# Patient Record
Sex: FEMALE | Race: BLACK OR AFRICAN AMERICAN | Employment: UNEMPLOYED | URBAN - METROPOLITAN AREA
[De-identification: names, ages, dates, MRNs, and addresses within clinical notes are randomized per-mention and may not be internally consistent; named-entity substitution may affect disease eponyms.]

---

## 2017-01-03 ENCOUNTER — OFFICE VISIT (OUTPATIENT)
Dept: OBGYN | Facility: CLINIC | Age: 22
End: 2017-01-03
Attending: OBSTETRICS & GYNECOLOGY
Payer: COMMERCIAL

## 2017-01-03 ENCOUNTER — TELEPHONE (OUTPATIENT)
Dept: OBGYN | Facility: CLINIC | Age: 22
End: 2017-01-03

## 2017-01-03 VITALS
HEART RATE: 93 BPM | SYSTOLIC BLOOD PRESSURE: 121 MMHG | BODY MASS INDEX: 40.47 KG/M2 | DIASTOLIC BLOOD PRESSURE: 78 MMHG | WEIGHT: 251.8 LBS | HEIGHT: 66 IN

## 2017-01-03 DIAGNOSIS — O09.93 HRP (HIGH RISK PREGNANCY), THIRD TRIMESTER: ICD-10-CM

## 2017-01-03 DIAGNOSIS — O36.63X0 LGA (LARGE FOR GESTATIONAL AGE) FETUS AFFECTING MANAGEMENT OF MOTHER, THIRD TRIMESTER, NOT APPLICABLE OR UNSPECIFIED FETUS: Primary | ICD-10-CM

## 2017-01-03 DIAGNOSIS — O09.93 HRP (HIGH RISK PREGNANCY), THIRD TRIMESTER: Primary | ICD-10-CM

## 2017-01-03 DIAGNOSIS — O36.63X0 LGA (LARGE FOR GESTATIONAL AGE) FETUS AFFECTING MANAGEMENT OF MOTHER, THIRD TRIMESTER, NOT APPLICABLE OR UNSPECIFIED FETUS: ICD-10-CM

## 2017-01-03 PROBLEM — O36.60X0 LGA (LARGE FOR GESTATIONAL AGE) FETUS AFFECTING MANAGEMENT OF MOTHER: Status: ACTIVE | Noted: 2017-01-03

## 2017-01-03 PROCEDURE — 76816 OB US FOLLOW-UP PER FETUS: CPT | Mod: ZF

## 2017-01-03 PROCEDURE — 99213 OFFICE O/P EST LOW 20 MIN: CPT | Mod: ZF

## 2017-01-03 ASSESSMENT — PAIN SCALES - GENERAL: PAINLEVEL: NO PAIN (0)

## 2017-01-03 NOTE — PROGRESS NOTES
22 year old female, , presents at 40 0/7 weeks for obstetric ultrasound assessment indicated by S>D.    Single fetus    Presentation cephalic    USEGA = 40 1/7 weeks.  EFW = 4532 grams, >97 % for 40 weeks.      MISAEL = 16.6.  FHR = 141bpm     Placenta anterior and grade 1    Comments: LGA growth pattern, normal MISAEL.  Pt does not have a h/o GDM.  Encourage IOL at 41 weeks.    Findings discussed with patient.    Further studies as clinically indicated.      TERRY Antonio MD, FACOG

## 2017-01-03 NOTE — Clinical Note
"1/3/2017       RE: Claudine Wells  [address]   [facility] [address]     Dear Colleague,    Thank you for referring your patient, Claudine Wells, to the [facility] at [facility]. Please see a copy of my visit note below.    Subjective:     22 year old  at 40w0d presents for routine prenatal visit following growth US today.           Denies vaginal bleeding or leakage of fluid.  Occasional contractions.  Positive fetal movement.        No HA, visual changes, RUQ or epigastric pain.   Patient concerns: Generally physically uncomfortable and looking forward to labor. Verbalizes concern with LGA baby and increased risk for c/s.  Objective:  Filed Vitals:    17 1202   BP: 121/78   Pulse: 93   Height: 1.676 m (5' 5.98\")   Weight: 114.216 kg (251 lb 12.8 oz)    See OB flowsheet  Assessment/Plan   (O09.93) HRP (high risk pregnancy), third trimester  (primary encounter diagnosis)  Comment: cx mid and soft.  Reviewed options for 41 week IOL and discussed MD would probably recommend this based on US information today with LGA fetus.  Plan: BPP (Single) w/out NST (In Clinic) if does not opt for IOL at 41 weeks.            - Reviewed postdates testing including BPP => 41 weeks and rationale for induction of labor based on results.   I reviewed with Claudine that I will be discussing US results with Dr Soares after her clinic departure and will call her with this information.  - Reviewed why/how to contact provider if headache/visual changes/RUQ or epigastric pain, decreased fetal movement, vaginal bleeding, leakage of fluid or strong/regular contractions.   Patient education/orders or handouts today:  Return to clinic in 1 week and prn if questions or concerns.     RAQUEL Angel CNM              "

## 2017-01-03 NOTE — Clinical Note
1/3/2017       RE: Claudine Wells  [address]   [place] [address]     Dear Colleague,    Thank you for referring your patient, Claudine Wells, to the [facility] at [facility]. Please see a copy of my visit note below.    22 year old female, , presents at 40 0/7 weeks for obstetric ultrasound assessment indicated by S>D.    Single fetus    Presentation cephalic    USEGA = 40 1/7 weeks.  EFW = 4532 grams, >97 % for 40 weeks.      MISAEL = 16.6.  FHR = 141bpm     Placenta anterior and grade 1    Comments: LGA growth pattern, normal MISAEL.  Pt does not have a h/o GDM.  Encourage IOL at 41 weeks.    Findings discussed with patient.    Further studies as clinically indicated.      TERRY Antonio MD, FACOG

## 2017-01-03 NOTE — TELEPHONE ENCOUNTER
----- Message from Estefany Molina RN sent at 1/3/2017  2:19 PM CST -----  Regarding: induction next week  khushi discussed with Dr Soares and recommendation is for her to be induced at 41 wks,need to let Claudine know this

## 2017-01-03 NOTE — TELEPHONE ENCOUNTER
Spoke to Claudine that Jennifer Cano talked with OB/GYn who is recommending she be induced at 41 wks,next Tuesday 1/19/17,if doesn't go into labor before.  Reviewed appointment for 1/9/17 in clinic.Pt indicated understanding and agreed with plan.      Called and spoke to [facility] at [facility] and scheduled her induction for 0830,1/10/17

## 2017-01-03 NOTE — PROGRESS NOTES
"Subjective:     22 year old  at 40w0d presents for routine prenatal visit following growth US today.           Denies vaginal bleeding or leakage of fluid.  Occasional contractions.  Positive fetal movement.        No HA, visual changes, RUQ or epigastric pain.   Patient concerns: Generally physically uncomfortable and looking forward to labor. Verbalizes concern with LGA baby and increased risk for c/s.  Objective:  Filed Vitals:    17 1202   BP: 121/78   Pulse: 93   Height: 1.676 m (5' 5.98\")   Weight: 114.216 kg (251 lb 12.8 oz)    See OB flowsheet  Assessment/Plan   (O09.93) HRP (high risk pregnancy), third trimester  (primary encounter diagnosis)  Comment: cx mid and soft.  Reviewed options for 41 week IOL and discussed MD would probably recommend this based on US information today with LGA fetus.  Plan: BPP (Single) w/out NST (In Clinic) if does not opt for IOL at 41 weeks.            - Reviewed postdates testing including BPP => 41 weeks and rationale for induction of labor based on results.   I reviewed with Claudine that I will be discussing US results with Dr Soares after her clinic departure and will call her with this information.  - Reviewed why/how to contact provider if headache/visual changes/RUQ or epigastric pain, decreased fetal movement, vaginal bleeding, leakage of fluid or strong/regular contractions.   Patient education/orders or handouts today:  Return to clinic in 1 week and prn if questions or concerns.   RAQUEL Angel CNM      "

## 2017-01-09 ENCOUNTER — OFFICE VISIT (OUTPATIENT)
Dept: OBGYN | Facility: CLINIC | Age: 22
End: 2017-01-09
Attending: OBSTETRICS & GYNECOLOGY
Payer: COMMERCIAL

## 2017-01-09 VITALS
SYSTOLIC BLOOD PRESSURE: 112 MMHG | DIASTOLIC BLOOD PRESSURE: 77 MMHG | HEART RATE: 121 BPM | WEIGHT: 257.5 LBS | BODY MASS INDEX: 41.38 KG/M2 | HEIGHT: 66 IN

## 2017-01-09 DIAGNOSIS — O48.0 POST-TERM PREGNANCY, 40-42 WEEKS OF GESTATION: Primary | ICD-10-CM

## 2017-01-09 DIAGNOSIS — O09.93 HRP (HIGH RISK PREGNANCY), THIRD TRIMESTER: ICD-10-CM

## 2017-01-09 DIAGNOSIS — O09.93 HRP (HIGH RISK PREGNANCY), THIRD TRIMESTER: Primary | ICD-10-CM

## 2017-01-09 DIAGNOSIS — O36.63X0 LGA (LARGE FOR GESTATIONAL AGE) FETUS AFFECTING MANAGEMENT OF MOTHER, THIRD TRIMESTER, NOT APPLICABLE OR UNSPECIFIED FETUS: ICD-10-CM

## 2017-01-09 PROCEDURE — 76819 FETAL BIOPHYS PROFIL W/O NST: CPT | Mod: ZF

## 2017-01-09 PROCEDURE — 99212 OFFICE O/P EST SF 10 MIN: CPT

## 2017-01-09 NOTE — PROGRESS NOTES
22 year old,  , presents at 40 6/7 weeks in pregnancy complicated by post dates, LGA for biophysical assessment.    Fetal Breathing Movements (FBM): Normal - 2  Gross Body Movements (GBM): Normal - 2  Fetal Tone (FT): Normal - 2  AFV: Pocket of amniotic fluid > or = to 2 cm x 2 cm - 2  Quantitative Amniotic Fluid Volume Total: 18.6 cm      BPP 8/8.  FHR = 151bpm   MCA Not done.  NST Not done.     Single fetus in cephalic presentation.  Placenta anterior and grade 1.    Recommend weekly assessment, twice weekly after 42 weeks if undelivered by then.    TERRY Davenport MD, FACOG

## 2017-01-09 NOTE — Clinical Note
"2017       RE: Claudine Wells  [address]   [facility] [address]     Dear Colleague,    Thank you for referring your patient, Claudine Wells, to [facility] Please see a copy of my visit note below.    Subjective:     22 year old  at 40w6d presents for routine prenatal visit.           Denies contractions, vaginal bleeding, discharge or leakage of fluid.  Reports + fetal movement.        No HA, visual changes, RUQ or epigastric pain.     Patient concerns: Feeling well overall but concerned about recommendation for IOL. At this time is unsure about induction tomorrow- concerned about  section and about not going into labor \"naturally.\" Family members also expressing concern. Pt had ultrasound that showed EFW >97%tile. BPP today . MISAEL 18.6. Reviewed ultrasound findings and OB recommendation for IOL at 41 weeks (1/10/17). Pt verbalized understanding of this but states \"I need more time to discuss with my family and decide.\"     Objective:  Filed Vitals:    17 1448   BP: 112/77   Pulse: 121   Height: 1.676 m (5' 5.98\")   Weight: 116.801 kg (257 lb 8 oz)    See OB flowsheet    /-4, posterior/medium, cephalic.  Membrane sweep performed.      Assessment/Plan     Encounter Diagnoses   Name Primary?     HRP (high risk pregnancy), third trimester Yes     LGA (large for gestational age) fetus affecting management of mother, third trimester, not applicable or unspecified fetus        Patient education/orders or handouts today:  Sign/symptoms of labor, When to call for labor or other concerns, Postdates testing discussion, BPP, NST and Induction of labor    - In depth discussion of recommendation by MD for IOL at 41 weeks d/t postdates and LGA on ultrasound.   - Reviewed cervical ripening and induction methods, including cervidil, misoprostol, cook catheter and pitocin.  - Discussed IOL rationale and risks vs " benefits. Reviewed u/s findings of LGA and risks.   - Also reviewed this information again with patient's sister via telephone.  - Pt verbalized understanding and states she would like to discuss with her family and call the clinic back with her decision. She is unsure if she wants an IOL tomorrow.  - Discussed need for BPP by Thursday if she does not proceed with IOL before that time.  - Oncoming CNMs notified about pt's plan to call tonight or tomorrow AM with decision about IOL.   - Plan for IOL on 1/10 if in agreement or need to plan for BPP on Thursday if not induced (order placed).  - Reviewed labor precautions, fetal movement counts and s/s to call/present to triage.    RAQUEL Izquierdo, CNM

## 2017-01-09 NOTE — NURSING NOTE
Chief Complaint   Patient presents with     Prenatal Care     40 weeks 6 days.        Ana M Pulido, [facility] 1/9/2017

## 2017-01-09 NOTE — Clinical Note
2017       RE: Claudine Wells  [address]   [facility] [address]     Dear Colleague,    Thank you for referring your patient, Claudine Wells, to the [facility] at [facility]. Please see a copy of my visit note below.    22 year old,  , presents at 40 6/7 weeks in pregnancy complicated by post dates, LGA for biophysical assessment.    Fetal Breathing Movements (FBM): Normal - 2  Gross Body Movements (GBM): Normal - 2  Fetal Tone (FT): Normal - 2  AFV: Pocket of amniotic fluid > or = to 2 cm x 2 cm - 2  Quantitative Amniotic Fluid Volume Total: 18.6 cm      BPP 8/8.  FHR = 151bpm   MCA Not done.  NST Not done.     Single fetus in cephalic presentation.  Placenta anterior and grade 1.    Recommend weekly assessment, twice weekly after 42 weeks if undelivered by then.    Anna Avina,TERRY Soares MD, FACOG

## 2017-01-09 NOTE — PROGRESS NOTES
"Subjective:     22 year old  at 40w6d presents for routine prenatal visit.           Denies contractions, vaginal bleeding, discharge or leakage of fluid.  Reports + fetal movement.        No HA, visual changes, RUQ or epigastric pain.     Patient concerns: Feeling well overall but concerned about recommendation for IOL. At this time is unsure about induction tomorrow- concerned about  section and about not going into labor \"naturally.\" Family members also expressing concern. Pt had ultrasound that showed EFW >97%tile. BPP today . MISAEL 18.6. Reviewed ultrasound findings and OB recommendation for IOL at 41 weeks (1/10/17). Pt verbalized understanding of this but states \"I need more time to discuss with my family and decide.\"     Objective:  Filed Vitals:    17 1448   BP: 112/77   Pulse: 121   Height: 1.676 m (5' 5.98\")   Weight: 116.801 kg (257 lb 8 oz)    See OB flowsheet    SVE 2/50/-4, posterior/medium, cephalic.  Membrane sweep performed.      Assessment/Plan     Encounter Diagnoses   Name Primary?     HRP (high risk pregnancy), third trimester Yes     LGA (large for gestational age) fetus affecting management of mother, third trimester, not applicable or unspecified fetus        Patient education/orders or handouts today:  Sign/symptoms of labor, When to call for labor or other concerns, Postdates testing discussion, BPP, NST and Induction of labor    - In depth discussion of recommendation by MD for IOL at 41 weeks d/t postdates and LGA on ultrasound.   - Reviewed cervical ripening and induction methods, including cervidil, misoprostol, cook catheter and pitocin.  - Discussed IOL rationale and risks vs benefits. Reviewed u/s findings of LGA and risks.   - Also reviewed this information again with patient's sister via telephone.  - Pt verbalized understanding and states she would like to discuss with her family and call the clinic back with her decision. She is unsure if she wants an IOL " tomorrow.  - Discussed need for BPP by Thursday if she does not proceed with IOL before that time.  - Oncoming CNMs notified about pt's plan to call tonight or tomorrow AM with decision about IOL.   - Plan for IOL on 1/10 if in agreement or need to plan for BPP on Thursday if not induced (order placed).  - Reviewed labor precautions, fetal movement counts and s/s to call/present to triage.    RAQUEL Izquierdo, CNM

## 2017-01-10 ENCOUNTER — ANESTHESIA (OUTPATIENT)
Dept: OBGYN | Facility: CLINIC | Age: 22
End: 2017-01-10
Payer: COMMERCIAL

## 2017-01-10 ENCOUNTER — HOSPITAL ENCOUNTER (INPATIENT)
Facility: CLINIC | Age: 22
LOS: 4 days | Discharge: HOME | End: 2017-01-14
Attending: ADVANCED PRACTICE MIDWIFE | Admitting: OBSTETRICS & GYNECOLOGY
Payer: COMMERCIAL

## 2017-01-10 ENCOUNTER — TELEPHONE (OUTPATIENT)
Dept: OBGYN | Facility: CLINIC | Age: 22
End: 2017-01-10

## 2017-01-10 ENCOUNTER — ANESTHESIA EVENT (OUTPATIENT)
Dept: OBGYN | Facility: CLINIC | Age: 22
End: 2017-01-10
Payer: COMMERCIAL

## 2017-01-10 DIAGNOSIS — Z98.891 S/P CESAREAN SECTION: Primary | ICD-10-CM

## 2017-01-10 LAB
ABO + RH BLD: NORMAL
ABO + RH BLD: NORMAL
BASOPHILS # BLD AUTO: 0 10E9/L (ref 0–0.2)
BASOPHILS NFR BLD AUTO: 0.2 %
BLD GP AB SCN SERPL QL: NORMAL
BLOOD BANK CMNT PATIENT-IMP: NORMAL
DIFFERENTIAL METHOD BLD: ABNORMAL
EOSINOPHIL # BLD AUTO: 0.1 10E9/L (ref 0–0.7)
EOSINOPHIL NFR BLD AUTO: 0.6 %
ERYTHROCYTE [DISTWIDTH] IN BLOOD BY AUTOMATED COUNT: 13.8 % (ref 10–15)
HCT VFR BLD AUTO: 34.3 % (ref 35–47)
HGB BLD-MCNC: 11.4 G/DL (ref 11.7–15.7)
IMM GRANULOCYTES # BLD: 0 10E9/L (ref 0–0.4)
IMM GRANULOCYTES NFR BLD: 0.2 %
LYMPHOCYTES # BLD AUTO: 1.8 10E9/L (ref 0.8–5.3)
LYMPHOCYTES NFR BLD AUTO: 16.9 %
MCH RBC QN AUTO: 29.1 PG (ref 26.5–33)
MCHC RBC AUTO-ENTMCNC: 33.2 G/DL (ref 31.5–36.5)
MCV RBC AUTO: 88 FL (ref 78–100)
MONOCYTES # BLD AUTO: 0.5 10E9/L (ref 0–1.3)
MONOCYTES NFR BLD AUTO: 4.4 %
NEUTROPHILS # BLD AUTO: 8.5 10E9/L (ref 1.6–8.3)
NEUTROPHILS NFR BLD AUTO: 77.7 %
NRBC # BLD AUTO: 0 10*3/UL
NRBC BLD AUTO-RTO: 0 /100
PLATELET # BLD AUTO: 165 10E9/L (ref 150–450)
RBC # BLD AUTO: 3.92 10E12/L (ref 3.8–5.2)
SPECIMEN EXP DATE BLD: NORMAL
T PALLIDUM IGG+IGM SER QL: NEGATIVE
WBC # BLD AUTO: 10.9 10E9/L (ref 4–11)

## 2017-01-10 PROCEDURE — 86900 BLOOD TYPING SEROLOGIC ABO: CPT | Performed by: ADVANCED PRACTICE MIDWIFE

## 2017-01-10 PROCEDURE — 25000125 ZZHC RX 250: Performed by: ANESTHESIOLOGY

## 2017-01-10 PROCEDURE — 00HU33Z INSERTION OF INFUSION DEVICE INTO SPINAL CANAL, PERCUTANEOUS APPROACH: ICD-10-PCS | Performed by: ANESTHESIOLOGY

## 2017-01-10 PROCEDURE — 85025 COMPLETE CBC W/AUTO DIFF WBC: CPT | Performed by: ADVANCED PRACTICE MIDWIFE

## 2017-01-10 PROCEDURE — 86901 BLOOD TYPING SEROLOGIC RH(D): CPT | Performed by: ADVANCED PRACTICE MIDWIFE

## 2017-01-10 PROCEDURE — 36415 COLL VENOUS BLD VENIPUNCTURE: CPT | Performed by: ADVANCED PRACTICE MIDWIFE

## 2017-01-10 PROCEDURE — 3E0R3CZ INTRODUCTION OF REGIONAL ANESTHETIC INTO SPINAL CANAL, PERCUTANEOUS APPROACH: ICD-10-PCS | Performed by: ANESTHESIOLOGY

## 2017-01-10 PROCEDURE — 12000030 ZZH R&B OB INTERMEDIATE UMMC

## 2017-01-10 PROCEDURE — 25800025 ZZH RX 258: Performed by: ADVANCED PRACTICE MIDWIFE

## 2017-01-10 PROCEDURE — 25000125 ZZHC RX 250: Performed by: ADVANCED PRACTICE MIDWIFE

## 2017-01-10 PROCEDURE — 86780 TREPONEMA PALLIDUM: CPT | Performed by: ADVANCED PRACTICE MIDWIFE

## 2017-01-10 PROCEDURE — 86850 RBC ANTIBODY SCREEN: CPT | Performed by: ADVANCED PRACTICE MIDWIFE

## 2017-01-10 RX ORDER — OXYTOCIN/0.9 % SODIUM CHLORIDE 30/500 ML
100-340 PLASTIC BAG, INJECTION (ML) INTRAVENOUS CONTINUOUS PRN
Status: DISCONTINUED | OUTPATIENT
Start: 2017-01-10 | End: 2017-01-11

## 2017-01-10 RX ORDER — NALBUPHINE HYDROCHLORIDE 10 MG/ML
2.5-5 INJECTION, SOLUTION INTRAMUSCULAR; INTRAVENOUS; SUBCUTANEOUS EVERY 6 HOURS PRN
Status: DISCONTINUED | OUTPATIENT
Start: 2017-01-10 | End: 2017-01-11

## 2017-01-10 RX ORDER — METHYLERGONOVINE MALEATE 0.2 MG/ML
200 INJECTION INTRAVENOUS
Status: DISCONTINUED | OUTPATIENT
Start: 2017-01-10 | End: 2017-01-11

## 2017-01-10 RX ORDER — NALOXONE HYDROCHLORIDE 0.4 MG/ML
.1-.4 INJECTION, SOLUTION INTRAMUSCULAR; INTRAVENOUS; SUBCUTANEOUS
Status: DISCONTINUED | OUTPATIENT
Start: 2017-01-10 | End: 2017-01-11

## 2017-01-10 RX ORDER — ONDANSETRON 2 MG/ML
4 INJECTION INTRAMUSCULAR; INTRAVENOUS EVERY 6 HOURS PRN
Status: DISCONTINUED | OUTPATIENT
Start: 2017-01-10 | End: 2017-01-11

## 2017-01-10 RX ORDER — LIDOCAINE HYDROCHLORIDE AND EPINEPHRINE 15; 5 MG/ML; UG/ML
INJECTION, SOLUTION EPIDURAL PRN
Status: DISCONTINUED | OUTPATIENT
Start: 2017-01-10 | End: 2018-12-11 | Stop reason: HOSPADM

## 2017-01-10 RX ORDER — IBUPROFEN 800 MG/1
800 TABLET, FILM COATED ORAL
Status: DISCONTINUED | OUTPATIENT
Start: 2017-01-10 | End: 2017-01-11

## 2017-01-10 RX ORDER — SODIUM CHLORIDE, SODIUM LACTATE, POTASSIUM CHLORIDE, CALCIUM CHLORIDE 600; 310; 30; 20 MG/100ML; MG/100ML; MG/100ML; MG/100ML
INJECTION, SOLUTION INTRAVENOUS CONTINUOUS
Status: DISCONTINUED | OUTPATIENT
Start: 2017-01-10 | End: 2017-01-11

## 2017-01-10 RX ORDER — OXYTOCIN 10 [USP'U]/ML
10 INJECTION, SOLUTION INTRAMUSCULAR; INTRAVENOUS
Status: DISCONTINUED | OUTPATIENT
Start: 2017-01-10 | End: 2017-01-11

## 2017-01-10 RX ORDER — OXYTOCIN/0.9 % SODIUM CHLORIDE 30/500 ML
1-24 PLASTIC BAG, INJECTION (ML) INTRAVENOUS CONTINUOUS
Status: DISCONTINUED | OUTPATIENT
Start: 2017-01-10 | End: 2017-01-11

## 2017-01-10 RX ORDER — OXYCODONE AND ACETAMINOPHEN 5; 325 MG/1; MG/1
1 TABLET ORAL
Status: DISCONTINUED | OUTPATIENT
Start: 2017-01-10 | End: 2017-01-11

## 2017-01-10 RX ORDER — FENTANYL CITRATE 50 UG/ML
50-100 INJECTION, SOLUTION INTRAMUSCULAR; INTRAVENOUS
Status: DISCONTINUED | OUTPATIENT
Start: 2017-01-10 | End: 2017-01-11

## 2017-01-10 RX ORDER — LIDOCAINE 40 MG/G
CREAM TOPICAL
Status: DISCONTINUED | OUTPATIENT
Start: 2017-01-10 | End: 2017-01-11

## 2017-01-10 RX ORDER — NALOXONE HYDROCHLORIDE 0.4 MG/ML
.1-.4 INJECTION, SOLUTION INTRAMUSCULAR; INTRAVENOUS; SUBCUTANEOUS
Status: DISCONTINUED | OUTPATIENT
Start: 2017-01-10 | End: 2017-01-10

## 2017-01-10 RX ORDER — LIDOCAINE HYDROCHLORIDE 10 MG/ML
INJECTION, SOLUTION INFILTRATION; PERINEURAL PRN
Status: DISCONTINUED | OUTPATIENT
Start: 2017-01-10 | End: 2018-12-11 | Stop reason: HOSPADM

## 2017-01-10 RX ORDER — ACETAMINOPHEN 325 MG/1
650 TABLET ORAL EVERY 4 HOURS PRN
Status: DISCONTINUED | OUTPATIENT
Start: 2017-01-10 | End: 2017-01-11

## 2017-01-10 RX ORDER — EPHEDRINE SULFATE 50 MG/ML
5 INJECTION, SOLUTION INTRAMUSCULAR; INTRAVENOUS; SUBCUTANEOUS
Status: DISCONTINUED | OUTPATIENT
Start: 2017-01-10 | End: 2017-01-11

## 2017-01-10 RX ORDER — CARBOPROST TROMETHAMINE 250 UG/ML
250 INJECTION, SOLUTION INTRAMUSCULAR
Status: DISCONTINUED | OUTPATIENT
Start: 2017-01-10 | End: 2017-01-11

## 2017-01-10 RX ADMIN — LIDOCAINE HYDROCHLORIDE 5 ML: 10 INJECTION, SOLUTION INFILTRATION; PERINEURAL at 23:30

## 2017-01-10 RX ADMIN — SODIUM CHLORIDE, POTASSIUM CHLORIDE, SODIUM LACTATE AND CALCIUM CHLORIDE: 600; 310; 30; 20 INJECTION, SOLUTION INTRAVENOUS at 11:37

## 2017-01-10 RX ADMIN — SODIUM CHLORIDE, POTASSIUM CHLORIDE, SODIUM LACTATE AND CALCIUM CHLORIDE: 600; 310; 30; 20 INJECTION, SOLUTION INTRAVENOUS at 18:57

## 2017-01-10 RX ADMIN — Medication 10 ML: at 23:38

## 2017-01-10 RX ADMIN — FENTANYL CITRATE 100 MCG: 50 INJECTION, SOLUTION INTRAMUSCULAR; INTRAVENOUS at 21:48

## 2017-01-10 RX ADMIN — Medication 10 ML/HR: at 23:38

## 2017-01-10 RX ADMIN — OXYTOCIN-SODIUM CHLORIDE 0.9% IV SOLN 30 UNIT/500ML 2 MILLI-UNITS/MIN: 30-0.9/5 SOLUTION at 15:43

## 2017-01-10 RX ADMIN — SODIUM CHLORIDE, POTASSIUM CHLORIDE, SODIUM LACTATE AND CALCIUM CHLORIDE: 600; 310; 30; 20 INJECTION, SOLUTION INTRAVENOUS at 22:47

## 2017-01-10 RX ADMIN — LIDOCAINE HYDROCHLORIDE,EPINEPHRINE BITARTRATE 3 ML: 15; .005 INJECTION, SOLUTION EPIDURAL; INFILTRATION; INTRACAUDAL; PERINEURAL at 23:32

## 2017-01-10 NOTE — H&P
"ADMIT NOTE  =================  41w0d    Claudine Wells is a 22 year old female with an Patient's last menstrual period was 2016 (approximate). and Estimated Date of Delivery: Arslan 3, 2017 [facility]10[facility] at 10:18 AM with with SROM without labor.     HPI  ================  Patient states that she woke this morning and began having leaking of amniotic fluid at 0900.    Contractions- none  Fetal movement- active  ROM- yes, large, clear   Vaginal bleeding- none  GBS- negative  FOB- is not involved  Other labor support- Patient's mother here    Weight gain- 71lbs  Height- 66\"  BMI- 29  First prenatal visit at 20 weeks, Total visits- 11    PROBLEM LIST  =================  Patient Active Problem List    Diagnosis Date Noted     Labor and delivery, indication for care 01/10/2017     Priority: Medium     Condyloma acuminatum of perianal region 2016     Priority: Medium     Other viral warts 2016     Priority: Medium     Chlamydia infection affecting pregnancy - rescreen negative 2016     Priority: Medium     Treated 16-will need DOMINICK  16- chlamydia neg  16: Ct negative       HRP (high risk pregnancy), third trimester 2016     Priority: Medium     10/18/2016 - 1 hour glucose 126, Hg 11.4. Undecided about BC - Mirena/Nexplanon reviewed in depth.  Declined water birth consent. Plans epidural, breastfeeding. No FOB involved.  16- MFM US wnl    16: MISAEL 17.6, EF 90%tike, 3258  2016: GBS negative         Vitamin D deficiency 2016     Priority: Medium       10/18/2016 - 3rd trimester 29. Continue Vitamin D daily.   Initial level 13.           Migraine 2016     Priority: Medium     LGA (large for gestational age) fetus affecting management of mother 2017     1/3/17-growth US completed:  EFW = 4532 grams, >97 % for 40 weeks.  Reviewed with Dr Soares who recommends IOL at 41 weeks.  Telephone message left for Claudine to call back " to discuss this recommendation.    2017 : IOL recommended for 1/10. Pt unsure- plans to call back tonight or tomorrow with decision. If declines IOL, needs BPP  (order placed)       HPV in female 2016     Perioral and erik-rectal.  Deferred erik-rectal treatment.  16: TCA treatment after consultation with Dr Rosario  Dermatology referral for further treatment.       Gonorrhea affecting pregnancy in second trimester - rescreen negative 2016     Treated 16-will need DOMINICK  16- GC neg  16: GC neg       Bacterial vaginal infection 2016     Treated Flagyl po       Attention deficit hyperactivity disorder (ADHD), predominantly inattentive type 2016     Per pt no meds          HISTORIES  ============  No Known Allergies  Past Medical History   Diagnosis Date     ADHD (attention deficit hyperactivity disorder)      Anemia      Migraines      Chlamydia      Gonorrhea      Past Surgical History   Procedure Laterality Date     No history of surgery     .  Family History   Problem Relation Age of Onset     DIABETES Mother      ?GDM     DIABETES Father      Coronary Artery Disease No family hx of      Hypertension No family hx of      Breast Cancer No family hx of      Colon Cancer No family hx of      Prostate Cancer No family hx of      Other Cancer No family hx of      Depression No family hx of      Anxiety Disorder No family hx of      Substance Abuse No family hx of      Anesthesia Reaction No family hx of      Asthma No family hx of      Genetic Disorder No family hx of      Thyroid Disease No family hx of      Obesity No family hx of      Social History   Substance Use Topics     Smoking status: Former Smoker -- 1.00 packs/day     Types: Cigarettes     Smokeless tobacco: Never Used     Alcohol Use: No     Obstetric History       T0      TAB0   SAB0   E0   M0   L0       # Outcome Date GA Lbr Clint/2nd Weight Sex Delivery Anes PTL Lv   1 Current              "       LABS:   ===========  Prenatal Labs:  Rhogam not indicated   Lab Results   Component Value Date    ABO O 04/08/2016    RH Pos 04/08/2016    AS negative 04/08/2016    HEPBANG Nonreactive 12/05/2016    TREPAB Negative 10/17/2016    HGB 11.4* 10/17/2016     Rubella immune  GBS   *   12/12/2016    Value: Negative  No GBS DNA detected, presumed negative for GBS or number of bacteria may be   below the limit of detection of the assay.   Assay performed on incubated broth culture of specimen using [employer] real-time   PCR.    ROS  =========  Pt denies significant respiratory, cardiovacular, GI, or muscular/skeletalcomplaints.    See RN data base ROS.       PHYSICAL EXAM:  ===============  Temp(Src) 98  F (36.7  C) (Oral)  Resp 18  Ht 1.676 m (5' 6\")  Wt 116.574 kg (257 lb)  BMI 41.50 kg/m2  LMP 03/29/2016 (Approximate)  General appearance: comfortable  GENERAL APPEARANCE: healthy, alert and no distress  RESP: lungs clear to auscultation - no rales, rhonchi or wheezes  BREAST: normal without masses, tenderness or nipple discharge and no palpable axillary masses or adenopathy  CV: regular rates and rhythm, normal S1 S2, no S3 or S4 and no murmur,and no varicosities  ABDOMEN:  soft, nontender, no epigastric pain  SKIN: no suspicious lesions or rashes  NEURO: Denies headache, blurred vision, other vision changes  PSYCH: mentation appears normal. and affect normal/bright  Legs: 1+ edema      Abdomen: gravid, vertex fetus per Leopold's, non-tender between contractions.   EFW-  10 lbs.   CONTACTIONS: none  FETAL HEART TONES: continuous EFM- baseline 145 with moderate variability and positive accelerations. No decelerations.  PELVIC EXAM: deferred  BLOODY SHOW: no   ROM:yes, large, clear  FLUID: clear  AMNISURE: not done, grossly ruptured    ASSESSMENT:  ==============  IUP @ 41w0d admitted with SROM without labor   NST REACTIVE  Fetal Heart Rate - category one  GBS- negative     PLAN:  ===========  Admit - see IP " orders  Pain medication options reviewed. Pt is interested in epidural  Labs ordered on admission to include: CBC, Type and screen (high risk for  section) and syphilis screening.  Ambulation, hydration, position changes, birthing ball and tub options to facilitate labor reviewed with pt .  Observation and reevaluate in 1-2 hours prRAQUEL Obregon CNM

## 2017-01-10 NOTE — IP AVS SNAPSHOT
[facility]    [address] [facility] [address]    Phone:  [phone]                                       After Visit Summary   1/10/2017    Claudine Wells    MRN: [id number]           After Visit Summary Signature Page     I have received my discharge instructions, and my questions have been answered. I have discussed any challenges I see with this plan with the nurse or doctor.    ..........................................................................................................................................  Patient/Patient Representative Signature      ..........................................................................................................................................  Patient Representative Print Name and Relationship to Patient    ..................................................               ................................................  Date                                            Time    ..........................................................................................................................................  Reviewed by Signature/Title    ...................................................              ..............................................  Date                                                            Time

## 2017-01-10 NOTE — TELEPHONE ENCOUNTER
Spoke to Claudine  at 41 wks GA who thinks her water broke this morning at 0900. Denies any contraction. Fluid is clear.  She is calling to day she is waiting for her father who will [facility] into [facility]. She is scheduled for induction this am.    Spoke to on call midwife, Taniya Bee,to let her know Claudine probably ruptured ,clear fluid and on her way in to hospital.

## 2017-01-10 NOTE — PLAN OF CARE
Problem: Labor (Cervical Ripen, Induct, Augment) (Adult,Obstetrics,Pediatric)  Goal: Signs and Symptoms of Listed Potential Problems Will be Absent or Manageable (Labor)  Signs and symptoms of listed potential problems will be absent or manageable by discharge/transition of care (reference Labor (Cervical Ripen, Induct, Augment) (Adult,Obstetrics,Pediatric) CPG).   Data: Patient admitted to room 475 at 1000. Patient is a . Prenatal record reviewed.   Obstetric History       T0      TAB0   SAB0   E0   M0   L0        # Outcome Date GA Lbr Clint/2nd Weight Sex Delivery Anes PTL Lv   1 Current                         .  Medical History:   Past Medical History   Diagnosis Date     ADHD (attention deficit hyperactivity disorder)      Anemia       Migraines       Chlamydia       Gonorrhea     .  Gestational age 41w0d. Vital signs per doc flowsheet. Fetal movement present. Patient reports Rule out rupture of membranes   as reason for admission. Support persons (mother) present.  Action:  Verbal consent for EFM, external fetal monitors applied. Admission assessment completed. Patient and support persons educated on labor process. Patient instructed to report change in fetal movement, contractions, vaginal leaking of fluid or bleeding, abdominal pain, or any concerns related to the pregnancy to her nurse/physician. Patient oriented to room, call light in reach.   Response: Taniya Bee CNM  informed of patient arrival and gross ROM. Plan per provider is assess for contractions and then possible augmentation/induction of labor. Patient verbalized understanding of education and verbalized agreement with plan.

## 2017-01-10 NOTE — PROGRESS NOTES
"Labor progress note    S:  Patient reports feeling some irregular contractions, denies pain.  Continues to leak moderate to large amounts of amniotic fluid.      O:  Blood pressure 110/68, pulse 104, temperature 98.1  F (36.7  C), temperature source Oral, resp. rate 18, height 1.676 m (5' 6\"), weight 116.574 kg (257 lb), last menstrual period 03/29/2016, not currently breastfeeding.  General appearance: comfortable.  Contractions: Every 5-7 minutes. 40-60 seconds duration.  Palpate: mild.  FHT: Baseline 155 with moderate variability. Accelerations are present. No decelerations present.  ROM: clear fluid. Membranes have been ruptured for 6.5 hours.  Pelvic exam: 3.5/ 50%/ Posterior/ average/ -1 forebag palpated, vertex ballotable.    Pitocin- none,  Antibiotics- none      A:  IUP @ 41w0d rupture of membranes with no labor   Fetal Heart rate tracing Category category one  GBS- negative  Patient Active Problem List   Diagnosis     Migraine     Chlamydia infection affecting pregnancy - rescreen negative     Gonorrhea affecting pregnancy in second trimester - rescreen negative     HRP (high risk pregnancy), third trimester     Bacterial vaginal infection     Vitamin D deficiency     Attention deficit hyperactivity disorder (ADHD), predominantly inattentive type     HPV in female     Condyloma acuminatum of perianal region     Other viral warts     LGA (large for gestational age) fetus affecting management of mother     Labor and delivery, indication for care         P:  Discussed lack of coordinated regular contractions since rupture or membranes.     Pitocin induction was recommended, patient gives verbal consent  Will plan labor epidural with more uncomfortable  Re-evaluate in 2-4 hours as needed  RAQUEL SharmaM  "

## 2017-01-10 NOTE — IP AVS SNAPSHOT
MRN:[id number]                      After Visit Summary   1/10/2017    Claudine Wells    MRN: [id number]           Thank you!     Thank you for choosing [facility] for your care. Our goal is always to provide you with excellent care. Hearing back from our patients is one way we can continue to improve our services. Please take a few minutes to complete the written survey that you may receive in the mail after you visit with us. Thank you!        Patient Information     Date Of Birth          1995        About your hospital stay     You were [facility] on:  January 10, 2017 [facility]    You were discharged on:  2017       Who to Call     For medical emergencies, please call 911.  For non-urgent questions about your medical care, please call your primary care provider or clinic, [phone]  For questions related to your surgery, please call your surgery clinic        Attending Provider     Provider    Taniya Bee APRN CNM Solitario, FERDINAND Francois Suzanne Marie, MD Pukitdannielle, Estefany Sutton MD       Primary Care Provider Office Phone # Fax #    [facility] [phone]       [address] [place] [address]. .  [facility] [address]        After Care Instructions     Activity       Review discharge instructions            Diet       Resume previous diet            Discharge Instructions - Postpartum visit       Schedule postpartum visit with your provider and return to clinic in 1 week and 6 weeks.                  Your next 10 appointments already scheduled     2017  2:15 PM   (Arrive by 2:00 PM)   Return Visit with JOSUE Ray [facility] ([facility])    [address] [facility] [address] [facility] [address]  3rd Floor  [facility] [address] [phone]              Further instructions from your care team       Postop  Birth Instructions    Activity       Do not lift more  than 10 pounds for 6 weeks after surgery.  Ask family and friends for help when you need it.    No driving until you have stopped taking your pain medications (usually two weeks after surgery).    No heavy exercise or activity for 6 weeks.  Don't do anything that will put a strain on your surgery site.    Don't strain when using the toilet.  Your care team may prescribe a stool softener if you have problems with your bowel movements.     To care for your incision:       Keep the incision clean and dry.    Do not soak your incision in water. No swimming or hot tubs until it has fully healed. You may soak in the bathtub if the water level is below your incision.    Do not use peroxide, gel, cream, lotion, or ointment on your incision.    Adjust your clothes to avoid pressure on your surgery site (check the elastic in your underwear for example).     You may see a small amount of clear or pink drainage and this is normal.  Check with your health care provider:       If the drainage increases or has an odor.    If the incision reddens, you have swelling, or develop a rash.    If you have increased pain and the medicine we prescribed doesn't help.    If you have a fever above 100.4 F (38 C) with or without chills when placing thermometer under your tongue.   The area around your incision (surgery wound), will feel numb.  This is normal. The numbness should go away in less than a year.     Keep your hands clean:  Always wash your hands before touching your incision (surgery wound). This helps reduce your risk of infection. If your hands aren't dirty, you may use an alcohol hand-rub to clean your hands. Keep your nails clean and short.    Call your healthcare provider if you have any of these symptoms:       You soak a sanitary pad with blood within 1 hour, or you see blood clots larger than a golf ball.    Bleeding that lasts more than 6 weeks.    Vaginal discharge that smells bad.    Severe pain, cramping or tenderness  in your lower belly area.    A need to urinate more frequently (use the toilet more often), more urgently (use the toilet very quickly), or it burns when you urinate.    Nausea and vomiting.    Redness, swelling or pain around a vein in your leg.    Problems breastfeeding or a red or painful area on your breast.    Chest pain and cough or are gasping for air.    Problems with coping with sadness, anxiety or depression. If you have concerns about hurting yourself or the baby, call your provider immediately.      You have questions or concerns after you return home.     Follow up in 6 weeks for post delivery check up.  If you are having problems or concern come in sooner.            Postop  Birth Instructions    Activity       Do not lift more than 10 pounds for 6 weeks after surgery.  Ask family and friends for help when you need it.    No driving until you have stopped taking your pain medications (usually two weeks after surgery).    No heavy exercise or activity for 6 weeks.  Don't do anything that will put a strain on your surgery site.    Don't strain when using the toilet.  Your care team may prescribe a stool softener if you have problems with your bowel movements.     To care for your incision:       Keep the incision clean and dry.    Do not soak your incision in water. No swimming or hot tubs until it has fully healed. You may soak in the bathtub if the water level is below your incision.    Do not use peroxide, gel, cream, lotion, or ointment on your incision.    Adjust your clothes to avoid pressure on your surgery site (check the elastic in your underwear for example).     You may see a small amount of clear or pink drainage and this is normal.  Check with your health care provider:       If the drainage increases or has an odor.    If the incision reddens, you have swelling, or develop a rash.    If you have increased pain and the medicine we prescribed doesn't help.    If you have a fever above  100.4 F (38 C) with or without chills when placing thermometer under your tongue.   The area around your incision (surgery wound), will feel numb.  This is normal. The numbness should go away in less than a year.     Keep your hands clean:  Always wash your hands before touching your incision (surgery wound). This helps reduce your risk of infection. If your hands aren't dirty, you may use an alcohol hand-rub to clean your hands. Keep your nails clean and short.    Call your healthcare provider if you have any of these symptoms:       You soak a sanitary pad with blood within 1 hour, or you see blood clots larger than a golf ball.    Bleeding that lasts more than 6 weeks.    Vaginal discharge that smells bad.    Severe pain, cramping or tenderness in your lower belly area.    A need to urinate more frequently (use the toilet more often), more urgently (use the toilet very quickly), or it burns when you urinate.    Nausea and vomiting.    Redness, swelling or pain around a vein in your leg.    Problems breastfeeding or a red or painful area on your breast.    Chest pain and cough or are gasping for air.    Problems with coping with sadness, anxiety or depression. If you have concerns about hurting yourself or the baby, call your provider immediately.      You have questions or concerns after you return home.                  Pending Results     No orders found from 1/9/2017 to 1/11/2017.            Statement of Approval     Ordered          01/14/17 1025  I have reviewed and agree with all the recommendations and orders detailed in this document.   EFFECTIVE NOW     Approved and electronically signed by:  Amy Jefferson MD             Admission Information        Provider Department Dept Phone    1/10/2017 Estefany Naylor MD [facility] [phone]      Your Vitals Were     Blood Pressure Pulse Temperature Respirations    118/65 mmHg 104 99  F (37.2  C) (Oral) 18    Height Weight BMI (Body Mass Index) Pulse  "Oximetry    1.676 m (5' 6\") 116.574 kg (257 lb) 41.50 kg/m2 100%    Last Period             2016 (Approximate)         [employer] Information     [employer] lets you send messages to your doctor, view your test results, renew your prescriptions, schedule appointments and more. To sign up, go to [email or web address] . Click on \"Log in\" on the left side of the screen, which will take you to the Welcome page. Then click on \"Sign up Now\" on the right side of the page.     You will be asked to enter the access code listed below, as well as some personal information. Please follow the directions to create your username and password.     Your access code is: [email or web address]  Expires: 2017  9:30 AM     Your access code will  in 90 days. If you need help or a new code, please call your [facility] clinic or [phone].        Care EveryWhere ID     This is your Care EveryWhere ID. This could be used by other organizations to access your [facility] medical records  [id number]           Review of your medicines      START taking        Dose / Directions    ferrous gluconate 324 (38 FE) MG tablet   Commonly known as:  FERGON   Used for:  S/P  section        Dose:  324 mg   Take 1 tablet (324 mg) by mouth daily (with breakfast)   Quantity:  100 tablet   Refills:  3       ibuprofen 400 MG tablet   Commonly known as:  ADVIL/MOTRIN   Used for:  S/P  section        Dose:  400-800 mg   Take 1-2 tablets (400-800 mg) by mouth every 6 hours as needed for other (cramping)   Quantity:  60 tablet   Refills:  0       oxyCODONE 5 MG IR tablet   Commonly known as:  ROXICODONE   Used for:  S/P  section        Dose:  5-10 mg   Take 1-2 tablets (5-10 mg) by mouth every 3 hours as needed for moderate to severe pain   Quantity:  30 tablet   Refills:  0       senna-docusate 8.6-50 MG per tablet   Commonly known as:  SENOKOT-S;PERICOLACE   Used for:  S/P  section        Dose:  1-2 tablet   Take 1-2 tablets " by mouth 2 times daily   Quantity:  60 tablet   Refills:  0         CONTINUE these medicines which may have CHANGED, or have new prescriptions. If we are uncertain of the size of tablets/capsules you have at home, strength may be listed as something that might have changed.        Dose / Directions    * acetaminophen 325 MG tablet   Commonly known as:  TYLENOL   This may have changed:  Another medication with the same name was added. Make sure you understand how and when to take each.   Used for:  Other viral warts        Dose:  650 mg   Take 2 tablets (650 mg) by mouth every 4 hours as needed for mild pain   Quantity:  30 tablet   Refills:  0       * acetaminophen 325 MG tablet   Commonly known as:  TYLENOL   This may have changed:  You were already taking a medication with the same name, and this prescription was added. Make sure you understand how and when to take each.   Used for:  S/P  section        Dose:  650 mg   Take 2 tablets (650 mg) by mouth every 4 hours as needed for other (surgical pain)   Quantity:  60 tablet   Refills:  3       * Notice:  This list has 2 medication(s) that are the same as other medications prescribed for you. Read the directions carefully, and ask your doctor or other care provider to review them with you.      CONTINUE these medicines which have NOT CHANGED        Dose / Directions    Azelaic Acid 15 % gel   Used for:  Acne vulgaris        Massage thin film gently into afected areas of the face twice a day morning and evening.   Quantity:  50 g   Refills:  6       cholecalciferol 5000 UNITS Caps capsule   Commonly known as:  vitamin D3   Used for:  Vitamin D deficiency        Dose:  5000 Units   Take 1 capsule (5,000 Units) by mouth daily Take one capsule daily.   Quantity:  90 capsule   Refills:  3       GNP PRENATAL VITAMINS 28-0.8 MG Tabs   Used for:  Vitamin D deficiency, HRP (high risk pregnancy), second trimester, GBS (group B streptococcus) UTI complicating  pregnancy, second trimester, HPV in female        Dose:  1 tablet   Take 1 tablet by mouth daily   Quantity:  100 tablet   Refills:  3            Where to get your medicines      These medications were sent to [facility] [place], [place] - [address]  [address] [facility] [address], [facility] [address]     Phone:  [phone]    - acetaminophen 325 MG tablet  - ferrous gluconate 324 (38 FE) MG tablet  - ibuprofen 400 MG tablet  - senna-docusate 8.6-50 MG per tablet      Some of these will need a paper prescription and others can be bought over the counter. Ask your nurse if you have questions.     Bring a paper prescription for each of these medications    - oxyCODONE 5 MG IR tablet             Protect others around you: Learn how to safely use, store and throw away your medicines at www.disposemymeds.org.             Medication List: This is a list of all your medications and when to take them. Check marks below indicate your daily home schedule. Keep this list as a reference.      Medications           Morning Afternoon Evening Bedtime As Needed    * acetaminophen 325 MG tablet   Commonly known as:  TYLENOL   Take 2 tablets (650 mg) by mouth every 4 hours as needed for mild pain   Last time this was given:  650 mg on 1/14/2017 11:19 AM                                * acetaminophen 325 MG tablet   Commonly known as:  TYLENOL   Take 2 tablets (650 mg) by mouth every 4 hours as needed for other (surgical pain)   Last time this was given:  650 mg on 1/14/2017 11:19 AM                                Azelaic Acid 15 % gel   Massage thin film gently into afected areas of the face twice a day morning and evening.                                cholecalciferol 5000 UNITS Caps capsule   Commonly known as:  vitamin D3   Take 1 capsule (5,000 Units) by mouth daily Take one capsule daily.                                ferrous gluconate 324 (38 FE) MG tablet   Commonly known as:  FERGON   Take 1 tablet (324  mg) by mouth daily (with breakfast)                                GNP PRENATAL VITAMINS 28-0.8 MG Tabs   Take 1 tablet by mouth daily                                ibuprofen 400 MG tablet   Commonly known as:  ADVIL/MOTRIN   Take 1-2 tablets (400-800 mg) by mouth every 6 hours as needed for other (cramping)   Last time this was given:  800 mg on 1/14/2017  9:07 AM                                oxyCODONE 5 MG IR tablet   Commonly known as:  ROXICODONE   Take 1-2 tablets (5-10 mg) by mouth every 3 hours as needed for moderate to severe pain   Last time this was given:  5 mg on 1/14/2017 11:19 AM                                senna-docusate 8.6-50 MG per tablet   Commonly known as:  SENOKOT-S;PERICOLACE   Take 1-2 tablets by mouth 2 times daily   Last time this was given:  2 tablets on 1/14/2017  9:07 AM                                * Notice:  This list has 2 medication(s) that are the same as other medications prescribed for you. Read the directions carefully, and ask your doctor or other care provider to review them with you.

## 2017-01-10 NOTE — PROVIDER NOTIFICATION
Position changed to high fowlers per patient with lates.  Lates resolved, minimal variablility noted.  Will notify CNM

## 2017-01-11 ENCOUNTER — ANESTHESIA EVENT (OUTPATIENT)
Dept: OBGYN | Facility: CLINIC | Age: 22
End: 2017-01-11
Payer: COMMERCIAL

## 2017-01-11 ENCOUNTER — ANESTHESIA (OUTPATIENT)
Dept: OBGYN | Facility: CLINIC | Age: 22
End: 2017-01-11
Payer: COMMERCIAL

## 2017-01-11 PROBLEM — Z98.891 S/P CESAREAN SECTION: Status: ACTIVE | Noted: 2017-01-11

## 2017-01-11 PROCEDURE — 25000130 H RX MED GY IP 250 OP 259 PS 637: Performed by: OBSTETRICS & GYNECOLOGY

## 2017-01-11 PROCEDURE — 40000977 ZZH STATISTIC ATTENDANCE AT DELIVERY

## 2017-01-11 PROCEDURE — 71000014 ZZH RECOVERY PHASE 1 LEVEL 2 FIRST HR: Performed by: OBSTETRICS & GYNECOLOGY

## 2017-01-11 PROCEDURE — 71000015 ZZH RECOVERY PHASE 1 LEVEL 2 EA ADDTL HR: Performed by: OBSTETRICS & GYNECOLOGY

## 2017-01-11 PROCEDURE — 27210794 ZZH OR GENERAL SUPPLY STERILE: Performed by: OBSTETRICS & GYNECOLOGY

## 2017-01-11 PROCEDURE — 25000565 ZZH ISOFLURANE, EA 15 MIN: Performed by: OBSTETRICS & GYNECOLOGY

## 2017-01-11 PROCEDURE — 25000125 ZZHC RX 250: Performed by: OBSTETRICS & GYNECOLOGY

## 2017-01-11 PROCEDURE — 37000008 ZZH ANESTHESIA TECHNICAL FEE, 1ST 30 MIN: Performed by: OBSTETRICS & GYNECOLOGY

## 2017-01-11 PROCEDURE — 37000009 ZZH ANESTHESIA TECHNICAL FEE, EACH ADDTL 15 MIN: Performed by: OBSTETRICS & GYNECOLOGY

## 2017-01-11 PROCEDURE — 25000125 ZZHC RX 250: Performed by: ANESTHESIOLOGY

## 2017-01-11 PROCEDURE — 25800025 ZZH RX 258: Performed by: ADVANCED PRACTICE MIDWIFE

## 2017-01-11 PROCEDURE — 40000275 ZZH STATISTIC RCP TIME EA 10 MIN

## 2017-01-11 PROCEDURE — 27110028 ZZH OR GENERAL SUPPLY NON-STERILE: Performed by: OBSTETRICS & GYNECOLOGY

## 2017-01-11 PROCEDURE — 40000170 ZZH STATISTIC PRE-PROCEDURE ASSESSMENT II: Performed by: OBSTETRICS & GYNECOLOGY

## 2017-01-11 PROCEDURE — 25800025 ZZH RX 258: Performed by: STUDENT IN AN ORGANIZED HEALTH CARE EDUCATION/TRAINING PROGRAM

## 2017-01-11 PROCEDURE — 36000059 ZZH SURGERY LEVEL 3 EA 15 ADDTL MIN UMMC: Performed by: OBSTETRICS & GYNECOLOGY

## 2017-01-11 PROCEDURE — 25000132 ZZH RX MED GY IP 250 OP 250 PS 637

## 2017-01-11 PROCEDURE — 25800025 ZZH RX 258: Performed by: OBSTETRICS & GYNECOLOGY

## 2017-01-11 PROCEDURE — 25000125 ZZHC RX 250

## 2017-01-11 PROCEDURE — 12000030 ZZH R&B OB INTERMEDIATE UMMC

## 2017-01-11 PROCEDURE — 25000132 ZZH RX MED GY IP 250 OP 250 PS 637: Performed by: OBSTETRICS & GYNECOLOGY

## 2017-01-11 PROCEDURE — 36000057 ZZH SURGERY LEVEL 3 1ST 30 MIN - UMMC: Performed by: OBSTETRICS & GYNECOLOGY

## 2017-01-11 PROCEDURE — 25000128 H RX IP 250 OP 636: Performed by: STUDENT IN AN ORGANIZED HEALTH CARE EDUCATION/TRAINING PROGRAM

## 2017-01-11 PROCEDURE — C9290 INJ, BUPIVACAINE LIPOSOME: HCPCS | Performed by: STUDENT IN AN ORGANIZED HEALTH CARE EDUCATION/TRAINING PROGRAM

## 2017-01-11 PROCEDURE — C1765 ADHESION BARRIER: HCPCS | Performed by: OBSTETRICS & GYNECOLOGY

## 2017-01-11 PROCEDURE — 25000125 ZZHC RX 250: Performed by: STUDENT IN AN ORGANIZED HEALTH CARE EDUCATION/TRAINING PROGRAM

## 2017-01-11 PROCEDURE — 99215 OFFICE O/P EST HI 40 MIN: CPT

## 2017-01-11 RX ORDER — MORPHINE SULFATE 1 MG/ML
INJECTION, SOLUTION EPIDURAL; INTRATHECAL; INTRAVENOUS
Status: DISCONTINUED
Start: 2017-01-11 | End: 2017-01-11 | Stop reason: HOSPADM

## 2017-01-11 RX ORDER — LANOLIN 100 %
OINTMENT (GRAM) TOPICAL
Status: DISCONTINUED | OUTPATIENT
Start: 2017-01-11 | End: 2017-01-14 | Stop reason: HOSPADM

## 2017-01-11 RX ORDER — AMOXICILLIN 250 MG
1-2 CAPSULE ORAL 2 TIMES DAILY
Status: DISCONTINUED | OUTPATIENT
Start: 2017-01-11 | End: 2017-01-14 | Stop reason: HOSPADM

## 2017-01-11 RX ORDER — HYDROCORTISONE 2.5 %
CREAM (GRAM) TOPICAL 3 TIMES DAILY PRN
Status: DISCONTINUED | OUTPATIENT
Start: 2017-01-11 | End: 2017-01-14 | Stop reason: HOSPADM

## 2017-01-11 RX ORDER — LIDOCAINE 40 MG/G
CREAM TOPICAL
Status: DISCONTINUED | OUTPATIENT
Start: 2017-01-11 | End: 2017-01-14 | Stop reason: HOSPADM

## 2017-01-11 RX ORDER — DIPHENHYDRAMINE HCL 25 MG
25 CAPSULE ORAL EVERY 6 HOURS PRN
Status: DISCONTINUED | OUTPATIENT
Start: 2017-01-11 | End: 2017-01-14 | Stop reason: HOSPADM

## 2017-01-11 RX ORDER — ONDANSETRON 2 MG/ML
INJECTION INTRAMUSCULAR; INTRAVENOUS PRN
Status: DISCONTINUED | OUTPATIENT
Start: 2017-01-11 | End: 2017-01-11

## 2017-01-11 RX ORDER — NALOXONE HYDROCHLORIDE 0.4 MG/ML
.1-.4 INJECTION, SOLUTION INTRAMUSCULAR; INTRAVENOUS; SUBCUTANEOUS
Status: DISCONTINUED | OUTPATIENT
Start: 2017-01-11 | End: 2017-01-14 | Stop reason: HOSPADM

## 2017-01-11 RX ORDER — KETOROLAC TROMETHAMINE 30 MG/ML
30 INJECTION, SOLUTION INTRAMUSCULAR; INTRAVENOUS EVERY 6 HOURS
Status: COMPLETED | OUTPATIENT
Start: 2017-01-11 | End: 2017-01-12

## 2017-01-11 RX ORDER — OXYTOCIN/0.9 % SODIUM CHLORIDE 30/500 ML
100 PLASTIC BAG, INJECTION (ML) INTRAVENOUS CONTINUOUS
Status: DISCONTINUED | OUTPATIENT
Start: 2017-01-11 | End: 2017-01-14 | Stop reason: HOSPADM

## 2017-01-11 RX ORDER — NALOXONE HYDROCHLORIDE 0.4 MG/ML
.1-.4 INJECTION, SOLUTION INTRAMUSCULAR; INTRAVENOUS; SUBCUTANEOUS
Status: DISCONTINUED | OUTPATIENT
Start: 2017-01-11 | End: 2017-01-11

## 2017-01-11 RX ORDER — SODIUM CHLORIDE, SODIUM LACTATE, POTASSIUM CHLORIDE, CALCIUM CHLORIDE 600; 310; 30; 20 MG/100ML; MG/100ML; MG/100ML; MG/100ML
INJECTION, SOLUTION INTRAVENOUS CONTINUOUS
Status: DISCONTINUED | OUTPATIENT
Start: 2017-01-11 | End: 2017-01-14 | Stop reason: HOSPADM

## 2017-01-11 RX ORDER — ONDANSETRON 4 MG/1
4 TABLET, ORALLY DISINTEGRATING ORAL EVERY 30 MIN PRN
Status: DISCONTINUED | OUTPATIENT
Start: 2017-01-11 | End: 2017-01-11 | Stop reason: HOSPADM

## 2017-01-11 RX ORDER — DEXAMETHASONE SODIUM PHOSPHATE 4 MG/ML
INJECTION, SOLUTION INTRA-ARTICULAR; INTRALESIONAL; INTRAMUSCULAR; INTRAVENOUS; SOFT TISSUE PRN
Status: DISCONTINUED | OUTPATIENT
Start: 2017-01-11 | End: 2017-01-11

## 2017-01-11 RX ORDER — FENTANYL CITRATE 50 UG/ML
25-50 INJECTION, SOLUTION INTRAMUSCULAR; INTRAVENOUS
Status: DISCONTINUED | OUTPATIENT
Start: 2017-01-11 | End: 2017-01-11 | Stop reason: HOSPADM

## 2017-01-11 RX ORDER — FENTANYL CITRATE 50 UG/ML
INJECTION, SOLUTION INTRAMUSCULAR; INTRAVENOUS PRN
Status: DISCONTINUED | OUTPATIENT
Start: 2017-01-11 | End: 2017-01-11

## 2017-01-11 RX ORDER — DEXTROSE, SODIUM CHLORIDE, SODIUM LACTATE, POTASSIUM CHLORIDE, AND CALCIUM CHLORIDE 5; .6; .31; .03; .02 G/100ML; G/100ML; G/100ML; G/100ML; G/100ML
INJECTION, SOLUTION INTRAVENOUS CONTINUOUS
Status: DISCONTINUED | OUTPATIENT
Start: 2017-01-11 | End: 2017-01-14 | Stop reason: HOSPADM

## 2017-01-11 RX ORDER — OXYTOCIN 10 [USP'U]/ML
10 INJECTION, SOLUTION INTRAMUSCULAR; INTRAVENOUS
Status: DISCONTINUED | OUTPATIENT
Start: 2017-01-11 | End: 2017-01-14 | Stop reason: HOSPADM

## 2017-01-11 RX ORDER — OXYCODONE HYDROCHLORIDE 5 MG/1
5-10 TABLET ORAL
Status: DISCONTINUED | OUTPATIENT
Start: 2017-01-11 | End: 2017-01-14 | Stop reason: HOSPADM

## 2017-01-11 RX ORDER — DIPHENHYDRAMINE HYDROCHLORIDE 50 MG/ML
25 INJECTION INTRAMUSCULAR; INTRAVENOUS EVERY 6 HOURS PRN
Status: DISCONTINUED | OUTPATIENT
Start: 2017-01-11 | End: 2017-01-14 | Stop reason: HOSPADM

## 2017-01-11 RX ORDER — LIDOCAINE HYDROCHLORIDE 20 MG/ML
INJECTION, SOLUTION INFILTRATION; PERINEURAL PRN
Status: DISCONTINUED | OUTPATIENT
Start: 2017-01-11 | End: 2017-01-11

## 2017-01-11 RX ORDER — MISOPROSTOL 200 UG/1
400 TABLET ORAL
Status: DISCONTINUED | OUTPATIENT
Start: 2017-01-11 | End: 2017-01-14 | Stop reason: HOSPADM

## 2017-01-11 RX ORDER — ONDANSETRON 4 MG/1
4 TABLET, ORALLY DISINTEGRATING ORAL EVERY 30 MIN PRN
Status: DISCONTINUED | OUTPATIENT
Start: 2017-01-11 | End: 2017-01-11

## 2017-01-11 RX ORDER — ACETAMINOPHEN 325 MG/1
975 TABLET ORAL EVERY 8 HOURS
Status: COMPLETED | OUTPATIENT
Start: 2017-01-11 | End: 2017-01-14

## 2017-01-11 RX ORDER — KETOROLAC TROMETHAMINE 30 MG/ML
INJECTION, SOLUTION INTRAMUSCULAR; INTRAVENOUS PRN
Status: DISCONTINUED | OUTPATIENT
Start: 2017-01-11 | End: 2017-01-11

## 2017-01-11 RX ORDER — IBUPROFEN 400 MG/1
400-800 TABLET, FILM COATED ORAL EVERY 6 HOURS PRN
Status: DISCONTINUED | OUTPATIENT
Start: 2017-01-11 | End: 2017-01-14 | Stop reason: HOSPADM

## 2017-01-11 RX ORDER — LIDOCAINE HCL/EPINEPHRINE/PF 2%-1:200K
VIAL (ML) INJECTION PRN
Status: DISCONTINUED | OUTPATIENT
Start: 2017-01-11 | End: 2017-01-11

## 2017-01-11 RX ORDER — OXYTOCIN/0.9 % SODIUM CHLORIDE 30/500 ML
340 PLASTIC BAG, INJECTION (ML) INTRAVENOUS CONTINUOUS PRN
Status: DISCONTINUED | OUTPATIENT
Start: 2017-01-11 | End: 2017-01-14 | Stop reason: HOSPADM

## 2017-01-11 RX ORDER — SODIUM CHLORIDE, SODIUM LACTATE, POTASSIUM CHLORIDE, CALCIUM CHLORIDE 600; 310; 30; 20 MG/100ML; MG/100ML; MG/100ML; MG/100ML
INJECTION, SOLUTION INTRAVENOUS CONTINUOUS
Status: DISCONTINUED | OUTPATIENT
Start: 2017-01-11 | End: 2017-01-11

## 2017-01-11 RX ORDER — LIDOCAINE 40 MG/G
CREAM TOPICAL
Status: DISCONTINUED | OUTPATIENT
Start: 2017-01-11 | End: 2017-01-11

## 2017-01-11 RX ORDER — ONDANSETRON 2 MG/ML
4 INJECTION INTRAMUSCULAR; INTRAVENOUS EVERY 30 MIN PRN
Status: DISCONTINUED | OUTPATIENT
Start: 2017-01-11 | End: 2017-01-11 | Stop reason: HOSPADM

## 2017-01-11 RX ORDER — BUPIVACAINE HYDROCHLORIDE AND EPINEPHRINE 2.5; 5 MG/ML; UG/ML
INJECTION, SOLUTION INFILTRATION; PERINEURAL PRN
Status: DISCONTINUED | OUTPATIENT
Start: 2017-01-11 | End: 2017-01-11

## 2017-01-11 RX ORDER — FENTANYL CITRATE 50 UG/ML
25-50 INJECTION, SOLUTION INTRAMUSCULAR; INTRAVENOUS
Status: DISCONTINUED | OUTPATIENT
Start: 2017-01-11 | End: 2017-01-11 | Stop reason: CLARIF

## 2017-01-11 RX ORDER — ONDANSETRON 2 MG/ML
4 INJECTION INTRAMUSCULAR; INTRAVENOUS EVERY 6 HOURS PRN
Status: DISCONTINUED | OUTPATIENT
Start: 2017-01-11 | End: 2017-01-11

## 2017-01-11 RX ORDER — SIMETHICONE 80 MG
80 TABLET,CHEWABLE ORAL 4 TIMES DAILY PRN
Status: DISCONTINUED | OUTPATIENT
Start: 2017-01-11 | End: 2017-01-14 | Stop reason: HOSPADM

## 2017-01-11 RX ORDER — LABETALOL HYDROCHLORIDE 5 MG/ML
10 INJECTION, SOLUTION INTRAVENOUS
Status: DISCONTINUED | OUTPATIENT
Start: 2017-01-11 | End: 2017-01-11 | Stop reason: CLARIF

## 2017-01-11 RX ORDER — BISACODYL 10 MG
10 SUPPOSITORY, RECTAL RECTAL DAILY PRN
Status: DISCONTINUED | OUTPATIENT
Start: 2017-01-13 | End: 2017-01-14 | Stop reason: HOSPADM

## 2017-01-11 RX ORDER — PROCHLORPERAZINE 25 MG
25 SUPPOSITORY, RECTAL RECTAL EVERY 12 HOURS PRN
Status: DISCONTINUED | OUTPATIENT
Start: 2017-01-11 | End: 2017-01-14 | Stop reason: HOSPADM

## 2017-01-11 RX ORDER — METHYLERGONOVINE MALEATE 0.2 MG/ML
200 INJECTION INTRAVENOUS
Status: DISCONTINUED | OUTPATIENT
Start: 2017-01-11 | End: 2017-01-14 | Stop reason: HOSPADM

## 2017-01-11 RX ORDER — EPHEDRINE SULFATE 50 MG/ML
5 INJECTION, SOLUTION INTRAMUSCULAR; INTRAVENOUS; SUBCUTANEOUS
Status: DISCONTINUED | OUTPATIENT
Start: 2017-01-11 | End: 2017-01-14 | Stop reason: HOSPADM

## 2017-01-11 RX ORDER — ONDANSETRON 2 MG/ML
4 INJECTION INTRAMUSCULAR; INTRAVENOUS EVERY 30 MIN PRN
Status: DISCONTINUED | OUTPATIENT
Start: 2017-01-11 | End: 2017-01-11

## 2017-01-11 RX ORDER — MORPHINE SULFATE 1 MG/ML
INJECTION, SOLUTION EPIDURAL; INTRATHECAL; INTRAVENOUS PRN
Status: DISCONTINUED | OUTPATIENT
Start: 2017-01-11 | End: 2017-01-11

## 2017-01-11 RX ORDER — CEFAZOLIN SODIUM 1 G/3ML
1 INJECTION, POWDER, FOR SOLUTION INTRAMUSCULAR; INTRAVENOUS
Status: DISCONTINUED | OUTPATIENT
Start: 2017-01-11 | End: 2017-01-11

## 2017-01-11 RX ORDER — HYDROMORPHONE HYDROCHLORIDE 1 MG/ML
.3-.5 INJECTION, SOLUTION INTRAMUSCULAR; INTRAVENOUS; SUBCUTANEOUS EVERY 30 MIN PRN
Status: DISCONTINUED | OUTPATIENT
Start: 2017-01-11 | End: 2017-01-14 | Stop reason: HOSPADM

## 2017-01-11 RX ORDER — HYDROMORPHONE HYDROCHLORIDE 1 MG/ML
.3-.5 INJECTION, SOLUTION INTRAMUSCULAR; INTRAVENOUS; SUBCUTANEOUS EVERY 5 MIN PRN
Status: DISCONTINUED | OUTPATIENT
Start: 2017-01-11 | End: 2017-01-11 | Stop reason: CLARIF

## 2017-01-11 RX ORDER — NALBUPHINE HYDROCHLORIDE 10 MG/ML
2.5-5 INJECTION, SOLUTION INTRAMUSCULAR; INTRAVENOUS; SUBCUTANEOUS EVERY 6 HOURS PRN
Status: DISCONTINUED | OUTPATIENT
Start: 2017-01-11 | End: 2017-01-14 | Stop reason: HOSPADM

## 2017-01-11 RX ORDER — ACETAMINOPHEN 325 MG/1
650 TABLET ORAL EVERY 4 HOURS PRN
Status: DISCONTINUED | OUTPATIENT
Start: 2017-01-14 | End: 2017-01-14 | Stop reason: HOSPADM

## 2017-01-11 RX ORDER — ONDANSETRON 2 MG/ML
4 INJECTION INTRAMUSCULAR; INTRAVENOUS EVERY 6 HOURS PRN
Status: DISCONTINUED | OUTPATIENT
Start: 2017-01-11 | End: 2017-01-14 | Stop reason: HOSPADM

## 2017-01-11 RX ORDER — CEFAZOLIN SODIUM 2 G/100ML
2 INJECTION, SOLUTION INTRAVENOUS
Status: COMPLETED | OUTPATIENT
Start: 2017-01-11 | End: 2017-01-11

## 2017-01-11 RX ORDER — SODIUM CHLORIDE, SODIUM LACTATE, POTASSIUM CHLORIDE, CALCIUM CHLORIDE 600; 310; 30; 20 MG/100ML; MG/100ML; MG/100ML; MG/100ML
INJECTION, SOLUTION INTRAVENOUS CONTINUOUS
Status: DISCONTINUED | OUTPATIENT
Start: 2017-01-11 | End: 2017-01-11 | Stop reason: HOSPADM

## 2017-01-11 RX ORDER — ONDANSETRON 4 MG/1
4 TABLET, ORALLY DISINTEGRATING ORAL EVERY 6 HOURS PRN
Status: DISCONTINUED | OUTPATIENT
Start: 2017-01-11 | End: 2017-01-14 | Stop reason: HOSPADM

## 2017-01-11 RX ORDER — CARBOPROST TROMETHAMINE 250 UG/ML
250 INJECTION, SOLUTION INTRAMUSCULAR
Status: DISCONTINUED | OUTPATIENT
Start: 2017-01-11 | End: 2017-01-14 | Stop reason: HOSPADM

## 2017-01-11 RX ORDER — PROPOFOL 10 MG/ML
INJECTION, EMULSION INTRAVENOUS PRN
Status: DISCONTINUED | OUTPATIENT
Start: 2017-01-11 | End: 2017-01-11

## 2017-01-11 RX ADMIN — DIPHENHYDRAMINE HYDROCHLORIDE 25 MG: 50 INJECTION, SOLUTION INTRAMUSCULAR; INTRAVENOUS at 11:07

## 2017-01-11 RX ADMIN — PHENYLEPHRINE HYDROCHLORIDE 100 MCG: 10 INJECTION, SOLUTION INTRAMUSCULAR; INTRAVENOUS; SUBCUTANEOUS at 05:54

## 2017-01-11 RX ADMIN — SODIUM CHLORIDE, SODIUM LACTATE, POTASSIUM CHLORIDE, CALCIUM CHLORIDE AND DEXTROSE MONOHYDRATE: 5; 600; 310; 30; 20 INJECTION, SOLUTION INTRAVENOUS at 09:53

## 2017-01-11 RX ADMIN — PHENYLEPHRINE HYDROCHLORIDE 100 MCG: 10 INJECTION, SOLUTION INTRAMUSCULAR; INTRAVENOUS; SUBCUTANEOUS at 05:50

## 2017-01-11 RX ADMIN — DIPHENHYDRAMINE HYDROCHLORIDE 25 MG: 25 CAPSULE ORAL at 22:01

## 2017-01-11 RX ADMIN — KETOROLAC TROMETHAMINE 30 MG: 30 INJECTION, SOLUTION INTRAMUSCULAR at 18:10

## 2017-01-11 RX ADMIN — LIDOCAINE HYDROCHLORIDE,EPINEPHRINE BITARTRATE 5 ML: 20; .005 INJECTION, SOLUTION EPIDURAL; INFILTRATION; INTRACAUDAL; PERINEURAL at 05:49

## 2017-01-11 RX ADMIN — LIDOCAINE HYDROCHLORIDE,EPINEPHRINE BITARTRATE 5 ML: 20; .005 INJECTION, SOLUTION EPIDURAL; INFILTRATION; INTRACAUDAL; PERINEURAL at 06:00

## 2017-01-11 RX ADMIN — BUPIVACAINE 20 ML: 13.3 INJECTION, SUSPENSION, LIPOSOMAL INFILTRATION at 08:19

## 2017-01-11 RX ADMIN — OXYCODONE HYDROCHLORIDE 5 MG: 5 TABLET ORAL at 21:30

## 2017-01-11 RX ADMIN — ACETAMINOPHEN 975 MG: 325 TABLET, FILM COATED ORAL at 18:10

## 2017-01-11 RX ADMIN — LIDOCAINE HYDROCHLORIDE,EPINEPHRINE BITARTRATE 5 ML: 20; .005 INJECTION, SOLUTION EPIDURAL; INFILTRATION; INTRACAUDAL; PERINEURAL at 05:54

## 2017-01-11 RX ADMIN — Medication 5 ML: at 05:35

## 2017-01-11 RX ADMIN — SODIUM CHLORIDE, POTASSIUM CHLORIDE, SODIUM LACTATE AND CALCIUM CHLORIDE: 600; 310; 30; 20 INJECTION, SOLUTION INTRAVENOUS at 06:11

## 2017-01-11 RX ADMIN — MIDAZOLAM HYDROCHLORIDE 2 MG: 1 INJECTION, SOLUTION INTRAMUSCULAR; INTRAVENOUS at 06:12

## 2017-01-11 RX ADMIN — MORPHINE SULFATE 4 MG: 1 INJECTION EPIDURAL; INTRATHECAL; INTRAVENOUS at 07:05

## 2017-01-11 RX ADMIN — LIDOCAINE HYDROCHLORIDE,EPINEPHRINE BITARTRATE 5 ML: 20; .005 INJECTION, SOLUTION EPIDURAL; INFILTRATION; INTRACAUDAL; PERINEURAL at 05:42

## 2017-01-11 RX ADMIN — OXYTOCIN-SODIUM CHLORIDE 0.9% IV SOLN 30 UNIT/500ML 18 UNITS/HR: 30-0.9/5 SOLUTION at 06:11

## 2017-01-11 RX ADMIN — BUPIVACAINE HYDROCHLORIDE AND EPINEPHRINE BITARTRATE 20 ML: 2.5; .005 INJECTION, SOLUTION INFILTRATION; PERINEURAL at 08:19

## 2017-01-11 RX ADMIN — Medication 5 MG: at 01:22

## 2017-01-11 RX ADMIN — SODIUM CHLORIDE, SODIUM LACTATE, POTASSIUM CHLORIDE, CALCIUM CHLORIDE AND DEXTROSE MONOHYDRATE: 5; 600; 310; 30; 20 INJECTION, SOLUTION INTRAVENOUS at 18:10

## 2017-01-11 RX ADMIN — SODIUM CHLORIDE, POTASSIUM CHLORIDE, SODIUM LACTATE AND CALCIUM CHLORIDE 1000 ML: 600; 310; 30; 20 INJECTION, SOLUTION INTRAVENOUS at 07:30

## 2017-01-11 RX ADMIN — Medication 0.2 MCG/KG/MIN: at 05:47

## 2017-01-11 RX ADMIN — SODIUM CHLORIDE, POTASSIUM CHLORIDE, SODIUM LACTATE AND CALCIUM CHLORIDE: 600; 310; 30; 20 INJECTION, SOLUTION INTRAVENOUS at 05:37

## 2017-01-11 RX ADMIN — SODIUM CITRATE AND CITRIC ACID MONOHYDRATE 30 ML: 500; 334 SOLUTION ORAL at 05:26

## 2017-01-11 RX ADMIN — SUCCINYLCHOLINE CHLORIDE 100 MG: 20 INJECTION, SOLUTION INTRAMUSCULAR; INTRAVENOUS at 06:06

## 2017-01-11 RX ADMIN — KETOROLAC TROMETHAMINE 30 MG: 30 INJECTION, SOLUTION INTRAMUSCULAR at 06:34

## 2017-01-11 RX ADMIN — DEXAMETHASONE SODIUM PHOSPHATE 8 MG: 4 INJECTION, SOLUTION INTRAMUSCULAR; INTRAVENOUS at 06:13

## 2017-01-11 RX ADMIN — OXYTOCIN-SODIUM CHLORIDE 0.9% IV SOLN 30 UNIT/500ML 100 ML/HR: 30-0.9/5 SOLUTION at 09:15

## 2017-01-11 RX ADMIN — SENNOSIDES AND DOCUSATE SODIUM 2 TABLET: 8.6; 5 TABLET ORAL at 10:00

## 2017-01-11 RX ADMIN — PHENYLEPHRINE HYDROCHLORIDE 100 MCG: 10 INJECTION, SOLUTION INTRAMUSCULAR; INTRAVENOUS; SUBCUTANEOUS at 06:41

## 2017-01-11 RX ADMIN — PROPOFOL 200 MG: 10 INJECTION, EMULSION INTRAVENOUS at 06:07

## 2017-01-11 RX ADMIN — PHENYLEPHRINE HYDROCHLORIDE 100 MCG: 10 INJECTION, SOLUTION INTRAMUSCULAR; INTRAVENOUS; SUBCUTANEOUS at 05:58

## 2017-01-11 RX ADMIN — PHENYLEPHRINE HYDROCHLORIDE 100 MCG: 10 INJECTION, SOLUTION INTRAMUSCULAR; INTRAVENOUS; SUBCUTANEOUS at 06:36

## 2017-01-11 RX ADMIN — ACETAMINOPHEN 975 MG: 325 TABLET, FILM COATED ORAL at 09:52

## 2017-01-11 RX ADMIN — SODIUM CHLORIDE, POTASSIUM CHLORIDE, SODIUM LACTATE AND CALCIUM CHLORIDE: 600; 310; 30; 20 INJECTION, SOLUTION INTRAVENOUS at 02:24

## 2017-01-11 RX ADMIN — KETOROLAC TROMETHAMINE 30 MG: 30 INJECTION, SOLUTION INTRAMUSCULAR at 12:53

## 2017-01-11 RX ADMIN — CEFAZOLIN SODIUM 2 G: 2 INJECTION, SOLUTION INTRAVENOUS at 05:53

## 2017-01-11 RX ADMIN — FENTANYL CITRATE 50 MCG: 50 INJECTION, SOLUTION INTRAMUSCULAR; INTRAVENOUS at 06:31

## 2017-01-11 RX ADMIN — ONDANSETRON 4 MG: 2 INJECTION INTRAMUSCULAR; INTRAVENOUS at 06:12

## 2017-01-11 RX ADMIN — SENNOSIDES AND DOCUSATE SODIUM 1 TABLET: 8.6; 5 TABLET ORAL at 21:21

## 2017-01-11 RX ADMIN — KETOROLAC TROMETHAMINE 30 MG: 30 INJECTION, SOLUTION INTRAMUSCULAR at 06:30

## 2017-01-11 RX ADMIN — SODIUM CHLORIDE, POTASSIUM CHLORIDE, SODIUM LACTATE AND CALCIUM CHLORIDE 1000 ML: 600; 310; 30; 20 INJECTION, SOLUTION INTRAVENOUS at 05:30

## 2017-01-11 ASSESSMENT — LIFESTYLE VARIABLES: TOBACCO_USE: 0

## 2017-01-11 ASSESSMENT — COPD QUESTIONNAIRES: COPD: 0

## 2017-01-11 NOTE — PROVIDER NOTIFICATION
Patient in PedsPACU; JAY Greenwood assuming care of patient at this time; This RN doing fundal checks every 15 minuters

## 2017-01-11 NOTE — PROGRESS NOTES
"Blood pressure 118/71, pulse 109, temperature 98.1  F (36.7  C), temperature source Oral, resp. rate 16, height 1.676 m (5' 6\"), weight 116.574 kg (257 lb), last menstrual period 03/29/2016, SpO2 100 %, not currently breastfeeding.  Patient Vitals for the past 24 hrs:   BP Temp Temp src Pulse Resp SpO2 Height Weight   01/11/17 0141 118/71 mmHg 98.1  F (36.7  C) Oral - 16 100 % - -   01/11/17 0128 91/50 mmHg - - - - - - -   01/11/17 0126 90/56 mmHg - - - - - - -   01/11/17 0123 (!) 84/52 mmHg - - - - - - -   01/11/17 0121 (!) 86/50 mmHg - - - - - - -   01/11/17 0100 - - - - 20 - - -   01/11/17 0044 (!) 88/53 mmHg - - - - 100 % - -   01/11/17 0028 91/53 mmHg - - - - - - -   01/11/17 0011 99/50 mmHg 98.3  F (36.8  C) Oral - 16 - - -   01/11/17 0002 104/52 mmHg - - - - - - -   01/10/17 2356 104/69 mmHg - - - - - - -   01/10/17 2349 93/52 mmHg - - - 16 99 % - -   01/10/17 2347 92/54 mmHg - - - - - - -   01/10/17 2345 94/58 mmHg - - - - - - -   01/10/17 2343 91/45 mmHg - - - - - - -   01/10/17 2340 116/47 mmHg - - - - - - -   01/10/17 2337 90/52 mmHg - - - 16 - - -   01/10/17 2333 (!) 89/52 mmHg - - - - - - -   01/10/17 2331 108/50 mmHg - - - - - - -   01/10/17 2151 106/57 mmHg 98.3  F (36.8  C) Oral - 20 - - -   01/10/17 2039 112/69 mmHg - - - - - - -   01/10/17 2032 (!) 89/46 mmHg - - - 16 - - -   01/10/17 2018 96/55 mmHg - - - - - - -   01/10/17 2000 104/52 mmHg 98.3  F (36.8  C) Oral - 16 - - -   01/10/17 1845 107/69 mmHg - - 109 18 - - -   01/10/17 1815 102/71 mmHg - - 98 18 - - -   01/10/17 1745 93/57 mmHg 98.6  F (37  C) Oral - - - - -   01/10/17 1645 102/61 mmHg - - 99 18 - - -   01/10/17 1615 101/69 mmHg - - - - - - -   01/10/17 1543 102/65 mmHg - - 110 - - - -   01/10/17 1520 110/68 mmHg 98.1  F (36.7  C) Oral 104 18 - - -   01/10/17 1300 - 98.3  F (36.8  C) Oral - - - - -   01/10/17 1100 129/70 mmHg - - - - - - -   01/10/17 1008 - 98  F (36.7  C) Oral - 18 - 1.676 m (5' 6\") 116.574 kg (257 lb)     General " appearance: comfortable after epidural placement  CONTRACTIONS: Contractions every 3-5 minutes.  Palpate: moderate  Pitocin- 8 mu/min.,  Antibiotics- none  FETAL HEART TONES: baseline 140 with periods of minimal variability and no accels. Intermittent late decelerations noted. Currently moderate variability following a scalp stim (10 beat rise after scalp stim). IVF bolus running, 02 on, position changes, ephedrine given after BP of 80s/50s  ROM: clear fluid  PELVIC EXAM: 4-5/90/-2, stretchy, caput felt, bloody show noted    ASSESSMENT:  ==============  IUP @ 41w1d in SROM x 16 hours, early labor  Fetal Heart Rate Tracing category two  GBS- negative  Pitocin @ 8mu    Patient Active Problem List   Diagnosis     Migraine     Chlamydia infection affecting pregnancy - rescreen negative     Gonorrhea affecting pregnancy in second trimester - rescreen negative     HRP (high risk pregnancy), third trimester     Bacterial vaginal infection     Vitamin D deficiency     Attention deficit hyperactivity disorder (ADHD), predominantly inattentive type     HPV in female     Condyloma acuminatum of perianal region     Other viral warts     LGA (large for gestational age) fetus affecting management of mother     Labor and delivery, indication for care       PLAN:  ===========  Fetal strip reviewed with Dr. Chinchilla. Continue to monitor closely.  IVF bolus running, 02 on.   Continue pitocin per protocol.  OB on call, Dr. Naylor, available prn.  Reevaluate prn per maternal/fetal indications.    Az Somers, RAQUEL, CNM

## 2017-01-11 NOTE — PROGRESS NOTES
"Blood pressure 98/55, pulse 109, temperature 99.1  F (37.3  C), temperature source Oral, resp. rate 16, height 1.676 m (5' 6\"), weight 116.574 kg (257 lb), last menstrual period 2016, SpO2 100 %, not currently breastfeeding.      S: Discussing fetal heart rate tracing and labor progress with patient. Discussed minimal variability, late decelerations and slow cervical change. Discussed indication for  section. Pt verbalized understanding of this information.    O: Minimal variability, repetitive late decelerations. Some periods of moderate variability, typically following a cervical exam with scalp stim.  Slow cervical change, last exam 5cm, significant molding and caput noted.    A: IUP @ 41+1 SROM x 18 hours    LGA >97%tile @ 40 weeks    Category 2 fetal tracing    P: Paged Dr. Naylor to discuss plan of care.      Recommended primary  section for category 2 fetal tracing, remote from delivery.      Dr. Chinchilla to bedside for consent.      Anesthesia and team notified.      Care transferred to OB team.    RAQUEL Izquierdo, FERDINAND       "

## 2017-01-11 NOTE — ANESTHESIA PROCEDURE NOTES
Peripheral Nerve Block Procedure Note    Staff:     Anesthesiologist:  SHARMAINE GOVEA    Resident/CRNA:  NIURKA NG    Block performed by resident/CRNA in the presence of a teaching physician    Location: PACU  Procedure Start/Stop TImes:      1/11/2017 8:13 AM     1/11/2017 8:24 AM    patient identified, IV checked, site marked, risks and benefits discussed, informed consent, monitors and equipment checked, pre-op evaluation, at physician/surgeon's request and post-op pain management      Correct Patient: Yes      Correct Position: Yes      Correct Site: Yes      Correct Procedure: Yes      Correct Laterality:  Yes    Site Marked:  Yes  Procedure details:     Procedure:  TAP    Laterality:  Bilateral    Position:  Supine    Sterile Prep: chloraprep, mask and sterile gloves      Insertion Site:  T9-10    Needle gauge:  21    Needle length (inches):  4    Ultrasound: Yes      Ultrasound used to identify targeted nerve, plexus, or vascular structure and placed a needle adjacent to it      Permanent Image entered into patiient's record      Abnormal pain on injection: No      Blood Aspirated: No      Paresthesias:  No    Bleeding at site: No      Bolus via:  Needle    Infusion Method:  Single Shot    Complications:  None

## 2017-01-11 NOTE — PROGRESS NOTES
Claudine Wells      MRN#: [id number]  Age: 22 year old      YOB: 1995      POD #0 S/P  Section - CNM Social Rounds  - Pt noted tired but coping well after c/section, her mother is supportive at bedside.   Reviewed with pt CNM role after C/section including social rounds.  All pt and pt's mother's questions discussed and answered.   Britt GARCÍA, CNM

## 2017-01-11 NOTE — ADDENDUM NOTE
Addendum  created 01/11/17 1329 by Derek Roach MD    Modules edited: Anesthesia Blocks and Procedures, Anesthesia Medication Administration, Clinical Notes    Clinical Notes:  File: [id number]

## 2017-01-11 NOTE — PROGRESS NOTES
"Blood pressure 107/69, pulse 109, temperature 98.6  F (37  C), temperature source Oral, resp. rate 18, height 1.676 m (5' 6\"), weight 116.574 kg (257 lb), last menstrual period 03/29/2016, not currently breastfeeding.  Patient Vitals for the past 24 hrs:   BP Temp Temp src Pulse Resp Height Weight   01/10/17 1845 107/69 mmHg - - 109 18 - -   01/10/17 1815 102/71 mmHg - - 98 18 - -   01/10/17 1745 93/57 mmHg 98.6  F (37  C) Oral - - - -   01/10/17 1645 102/61 mmHg - - 99 18 - -   01/10/17 1615 101/69 mmHg - - - - - -   01/10/17 1543 102/65 mmHg - - 110 - - -   01/10/17 1520 110/68 mmHg 98.1  F (36.7  C) Oral 104 18 - -   01/10/17 1300 - 98.3  F (36.8  C) Oral - - - -   01/10/17 1100 129/70 mmHg - - - - - -   01/10/17 1008 - 98  F (36.7  C) Oral - 18 1.676 m (5' 6\") 116.574 kg (257 lb)     General appearance: comfortable; states she feels intermittent cramping and tightening, states that it is not uncomfortable  CONTRACTIONS: Contractions every 2-4 minutes.  Palpate: mild  Pitocin- 6 mu/min.,  Antibiotics- none  FETAL HEART TONES: baseline 140 with moderate FHR variability and  pos accelerations.  No decelerations present.    ROM: clear fluid  PELVIC EXAM:deferred    ASSESSMENT:  ==============  IUP @ 41w0d with SROM without labor   Fetal Heart Rate Tracing category one  GBS- negative    Patient Active Problem List   Diagnosis     Migraine     Chlamydia infection affecting pregnancy - rescreen negative     Gonorrhea affecting pregnancy in second trimester - rescreen negative     HRP (high risk pregnancy), third trimester     Bacterial vaginal infection     Vitamin D deficiency     Attention deficit hyperactivity disorder (ADHD), predominantly inattentive type     HPV in female     Condyloma acuminatum of perianal region     Other viral warts     LGA (large for gestational age) fetus affecting management of mother     Labor and delivery, indication for care       PLAN:  ===========  Ambulation, hydration, position " changes, birthing ball/sling and tub options to facilitate labor.  Pt desires to ambulate. Plan to place pt on continuous monitoring via telemetry so she can ambulate in tian.  Continue labor induction with Pitocin per protocol.  MD consultant on call Dr. Naylor/ available prn   Reevaluate prn per maternal/fetal indications.     Az Somers, APRN, CNM

## 2017-01-11 NOTE — PROGRESS NOTES
"Blood pressure 106/57, pulse 109, temperature 98.3  F (36.8  C), temperature source Oral, resp. rate 20, height 1.676 m (5' 6\"), weight 116.574 kg (257 lb), last menstrual period 03/29/2016, not currently breastfeeding.    General appearance: uncomfortable with contractions; requesting epidural and cervical exam  CONTRACTIONS: Contractions every 2-4 minutes.  Palpate: moderate  Pitocin- 8 mu/min.,  Antibiotics- none  FETAL HEART TONES: baseline 135 with moderate FHR variability and  pos accelerations.  No decelerations present.    ROM: copious amounts of clear fluid  PELVIC EXAM: 4/80/-2, mid/soft    ASSESSMENT:  ==============  IUP @ 41w0d SROM x 13 hours, early labor  Fetal Heart Rate Tracing category one  GBS- negative  Pitocin @ 8 mu     Patient Active Problem List   Diagnosis     Migraine     Chlamydia infection affecting pregnancy - rescreen negative     Gonorrhea affecting pregnancy in second trimester - rescreen negative     HRP (high risk pregnancy), third trimester     Bacterial vaginal infection     Vitamin D deficiency     Attention deficit hyperactivity disorder (ADHD), predominantly inattentive type     HPV in female     Condyloma acuminatum of perianal region     Other viral warts     LGA (large for gestational age) fetus affecting management of mother     Labor and delivery, indication for care      PLAN:  ===========  Anesthesia called for epidural placement per pt request.  IVF bolus administered.  Continue labor induction with Pitocin per protocol.  MD consultant on call Dr Naylor/ available prn   Reevaluate in 2-4 hours or prn per maternal/fetal indications    RAQUEL Izquierdo, FERDINAND   "

## 2017-01-11 NOTE — ANESTHESIA PREPROCEDURE EVALUATION
Anesthesia Evaluation     . Pt has had prior anesthetic. Type: General    No history of anesthetic complications     ROS/MED HX    ENT/Pulmonary:      (-) tobacco use, asthma and COPD   Neurologic:      (-) CVA, TIA and Neuropathy   Cardiovascular:        (-) hypertension, CAD, irregular heartbeat/palpitations and stent   METS/Exercise Tolerance:     Hematologic:        (-) anemia   Musculoskeletal:         GI/Hepatic:        (-) GERD and liver disease   Renal/Genitourinary:      (-) renal disease   Endo:      (-) Type I DM, Type II DM and thyroid disease   Psychiatric:         Infectious Disease:  - neg infectious disease ROS       Malignancy:         Other:               Physical Exam  Normal systems: cardiovascular, pulmonary and dental    Airway   Mallampati: III  TM distance: >3 FB  Neck ROM: full    Dental     Cardiovascular   Rhythm and rate: regular and normal      Pulmonary    breath sounds clear to auscultation                  HPI: Claudine Wells is a  22 year old female with no significant PMH  now with an IUP at 41w1d complicated by category 2 fetal tracing remote from delivery. Has function epidural that is functioning well.    History and physical reviewed; no interval change.    Procedure: Procedure(s):   - Wound Class: I-Clean         PMHx/PSHx/ROS:  Past Medical History   Diagnosis Date     ADHD (attention deficit hyperactivity disorder)      Anemia      Migraines      Chlamydia      Gonorrhea        Past Surgical History   Procedure Laterality Date     No history of surgery           Anesthesia Plan      History & Physical Review  History and physical reviewed and following examination; no interval change.    ASA Status:  2 .        Plan for Epidural   PONV prophylaxis:  Ondansetron (or other 5HT-3)       Postoperative Care  Postoperative pain management:  IV analgesics and Peripheral nerve block (Single Shot).  Plan for postoperative opioid use.    Consents  Anesthetic plan, risks,  benefits and alternatives discussed with:  Patient or representative..

## 2017-01-11 NOTE — ANESTHESIA PROCEDURE NOTES
Epidural Procedure Note    Staff:     Anesthesiologist:  TEO RAMIREZ    Resident/CRNA:  NELLA FLOWERS    Procedure performed by resident/CRNA in the presence of a teaching physician    Location: OB and floor     Procedure start time:  1/10/2017 11:05 PM     Procedure end time:  1/10/2017 11:41 PM   Pre-procedure checklist:   patient identified, IV checked, site marked, risks and benefits discussed, informed consent, monitors and equipment checked, pre-op evaluation, at physician/surgeon's request and post-op pain management      Correct Patient: Yes      Correct Position: Yes      Correct Site: Yes      Correct Procedure: Yes      Correct Laterality:  Yes    Site Marked:  Yes  Procedure:     Procedure:  Epidural catheter    Position:  Sitting    Sterile Prep: chloraprep, mask, sterile gloves and patient draped      Insertion site:  L3-4    Local skin infiltration:  1% lidocaine    amount (mL):  5    Approach:  Midline    Needle gauge (G):  17    Needle Length (in):  3.5    Block Needle Type:  Touhy    Injection Technique:  LORT saline and LORT air    MICHELLE at (cm):  9    Attempts:  2    Redirects:  2    Catheter gauge (G):  19    Catheter threaded easily: Yes      Threaded to cm at skin:  14    Threaded in epidural space (cm):  5    Paresthesias:  No    Aspiration negative for Heme or CSF: Yes      Test dose (mL):  3     Local anesthetic:  Lidocaine 1.5% w/ 1:200,000 epinephrine    Test dose time:  23:32    Test dose negative for signs of intravascular, subdural or intrathecal injection: Yes

## 2017-01-11 NOTE — PROGRESS NOTES
"Blood pressure 94/55, pulse 109, temperature 99.1  F (37.3  C), temperature source Oral, resp. rate 16, height 1.676 m (5' 6\"), weight 116.574 kg (257 lb), last menstrual period 03/29/2016, SpO2 100 %, not currently breastfeeding.    SVE performed following period of repetitive late decelerations. , minimal variability with periods of moderate variability.   PELVIC EXAM: 5/90/-2, stretchy to 6cm but tight 5 with contractions. Significant fetal head molding and caput since last exam.  Scalp stim done, 10x10    ASSESSMENT:  ==============  IUP @ 41w1d  SROM x 18 hours  Fetal Heart Rate Tracing category two  GBS- negative  LGA  Minimal cervical change  Pitocin @ 9mu    Patient Active Problem List   Diagnosis     Migraine     Chlamydia infection affecting pregnancy - rescreen negative     Gonorrhea affecting pregnancy in second trimester - rescreen negative     HRP (high risk pregnancy), third trimester     Bacterial vaginal infection     Vitamin D deficiency     Attention deficit hyperactivity disorder (ADHD), predominantly inattentive type     HPV in female     Condyloma acuminatum of perianal region     Other viral warts     LGA (large for gestational age) fetus affecting management of mother     Labor and delivery, indication for care       PLAN:  ===========  Continue to monitor closely. IVF, 02, position change.  Fetal tracing and slow labor progression to be reviewed with Dr. Naylor.      Az Somers, RAQUEL, FELICITYM         "

## 2017-01-11 NOTE — ANESTHESIA POSTPROCEDURE EVALUATION
Patient: Claudine Wells     SECTION (N/A Abdomen)  Additional InformationProcedure(s):   - Wound Class: I-Clean    Diagnosis:Pregnancy  Diagnosis Additional Information: No value filed.    Anesthesia Type:  No value filed.    Note:  Anesthesia Post Evaluation    Patient location during evaluation: PACU  Patient participation: Able to participate in evaluation but full recovery from regional anesthesia has not yet ocurrred but is anticipated to occur within 48 hours  Level of consciousness: awake and alert  Pain management: adequate  Airway patency: patent  Cardiovascular status: acceptable  Respiratory status: acceptable  Hydration status: acceptable  PONV: none     Anesthetic complications: None          Last vitals:  Filed Vitals:    17 0431 17 0720 17 0730   BP: 98/55 116/54 110/57   Pulse:      Temp:  36.5  C (97.7  F)    Resp:  16 16   SpO2:  100% 100%       Electronically Signed By: Alanna Mathews MD  2017  8:52 AM

## 2017-01-11 NOTE — CONSULTS
OB Consult    Asked by primary CNM Az Somers to evaluate patient. She is a 22 year old  at 41w1d who presented with PROM. Pregnancy complicated by chlamydia, gonorrhea, perianal warts in pregnancy. Also complicated by LGA fetus, measuring 4532g >97%ile at 40 weeks. She was augmented with pitocin. During her labor course she intermittently had periods of minimal variability with recurrent late decelerations. These initially improved with intrauterine resuscitation (fluid bolus, repositioning, etc). She slowly progressed to 5 centimeters. However, she continued to have minimal variability and periods of recurrent late decelerations, which became minimally responsive to resuscitative efforts. Due to category II FHT remote from delivery, a  section was recommended. The risks, benefits, and alternatives were discussed. Risks include infection, bleeding, and injury to intraabdominal structures. We discussed the measures we take to decrease the chance of these . Informed consent was obtained.  - Proceed to OR  - 2g ancef for prophylaxis  - SCDs for DVT prophylaxis  - Anesthesia consult  - Nicu for delivery  - Will obtain cord gases    Joyce Chinchilla MD PGY3  Department of OB/GYN  2017 5:38 AM     Appreciate Dr. Chinchilla's note above, patient also seen and examined by me. I agree with the note above.   I have personally counseled the patient on the risks and benefits of surgery and all questions answered.  Estefany Naylor MD

## 2017-01-11 NOTE — ANESTHESIA PREPROCEDURE EVALUATION
Anesthesia Evaluation       history and physical reviewed .      No history of anesthetic complications     ROS/MED HX    ENT/Pulmonary:  - neg pulmonary ROS     Neurologic:  - neg neurologic ROS     Cardiovascular:  - neg cardiovascular ROS       METS/Exercise Tolerance:     Hematologic:         Musculoskeletal:         GI/Hepatic:  - neg GI/hepatic ROS       Renal/Genitourinary:         Endo:         Psychiatric:         Infectious Disease:         Malignancy:         Other:               Physical Exam  Normal systems: cardiovascular, pulmonary and dental    Airway   Mallampati: III  TM distance: > 3 FB  Neck ROM: full  Mouth opening: > 3 cm    Dental     Cardiovascular       Pulmonary           neg OB ROS                 Anesthesia Plan      History & Physical Review      ASA Status:  .  OB Epidural Asa: 2            Postoperative Care      Consents  Anesthetic plan, risks, benefits and alternatives discussed with:  Patient..

## 2017-01-11 NOTE — PLAN OF CARE
Problem: Labor (Cervical Ripen, Induct, Augment) (Adult,Obstetrics,Pediatric)  Goal: Signs and Symptoms of Listed Potential Problems Will be Absent or Manageable (Labor)  Signs and symptoms of listed potential problems will be absent or manageable by discharge/transition of care (reference Labor (Cervical Ripen, Induct, Augment) (Adult,Obstetrics,Pediatric) CPG).   Outcome: Change based on patient need/priority Date Met:  01/11/17  PCS MALE PER DR. MAYA  SEE DELIVERY SUMMARY.

## 2017-01-11 NOTE — OP NOTE
[facility]  Obstetrics Service Operative Report    Surgery Date:  2017  Surgeon(s): Estefany Naylor MD  Assistants:  Joyce Chinchilla, PGY3; Enoc Suazo, MS4    Preoperative Diagnoses:  1.  Intrauterine pregnancy at 41w1d  2.  Category II FHT remote from delivery  3. Prolonged labor course  4. Suspected fetal macrosomia    Postoperative diagnoses: Same     Procedure performed:  primary low segment transverse  section via Pfannenstiel incision with double layer uterine closure    Anesthesia:  Epidural with duramorph, conversion to GETA prior to start of procedure  Est Blood Loss (mL): 800 mL  Fluid replacement: 1400 mL lactated Ringer's.   Urine output: 300 mL  Specimens: None  Complications: None apparent      Operative findings:   1. No adhesions  2. Clear amniotic fluid  3. Liveborn male infant in occiput posterior presentation. Apgars 3 at 1 minute, 6 at 5 minutes, 8 at 10 minutes. Weight 9 lb 8oz.  4. Arterial cord pH 7.29, base deficit 2.1. Venous cord pH 7.31, base deficit 1.5.  5. Normal uterus, fallopian tubes, and ovaries.    Indication: Claudine Wells is a 22 year old, , who was admitted to the [facility] service at 41w1d PROM. She was augmented with pitocin. During her labor course she intermittently had periods of minimal variability with recurrent late decelerations. These initially improved with intrauterine resuscitation (fluid bolus, repositioning, etc). She slowly progressed to 5 centimeters. However, she continued to have minimal variability and periods of recurrent late decelerations, which became minimally responsive to resuscitative efforts. Due to category II FHT remote from delivery, a  section was recommended. The risks, benefits, and alternatives were discussed. Risks include infection, bleeding, and injury to intraabdominal structures. We discussed the measures we take to decrease the chance of these . Informed consent was obtained.     Procedure  details: After obtaining informed consent the patient was taken to the operating room. A safety patient time out was performed prior to the procedure. SCD's were placed. The patient's epidural was bolused. She was placed in the dorsal supine position with a leftward tilt and prepped and draped in the usual sterile fashion. NICU was present and available for baby. The abdomen was tested, but the patient continued to feel pain on her left abdomen, even after additional pain medication. The decision was made to proceed with GETA.     Following induction of general anesthesia, the abdomen was entered through a Pfannenstiel skin incision The skin incision was made sharply and carried through the subcutaneous tissue to the fascia.  Fascia was incised in the midline and extended bluntly.  The superior margin of the fascial incision was bluntly dissected from the underlying muscle, which was then repeated at the lower margin of the fascial incision.  The muscle was  in the midline.  The peritoneum was entered bluntly and the opening extended by digital and cautery dissection with care to avoid the bladder. A bladder blade was placed. The lower segment of the uterus was opened sharply in a transverse fashion and extended with digital pressure. The infant's head was elevated to the level of the hysterotomy and was delivered atraumatically. The cord was doubly clamped and cut and the infant was handed off to the waiting NICU team. A segment of the cord was cut and sent for cord gases.     The placenta was extracted with fundal massage and cord traction.  The uterus was exteriorized from the abdomen and cleared of all clots and debris.  The uterus was massaged and was noted to be firm.  Oxytocin was given through the running IV.  With vigorous massage as well as administration of oxytocin, good uterine tone was achieved. The hysterotomy was repaired with 0-vicryl suture in a running locked fashion. A 2nd layer of  0-monocryl was used to imbricate the incision and good hemostasis was achieved. The posterior cul-de-sac was irrigated and the uterus was returned to the abdomen.  The hysterotomy was again inspected and found to have no active sites of bleeding.  The abdominal wall was examined and found to have no active sites of bleeding.  Seprafilm was placed over the hysterotomy and rectus muscles.    The fascia was closed with a running suture of 0-vicryl.  Subcutaneous tissue was irrigated. Areas that were oozing were controlled with cautery. The subcutaneous tissue was 3cm in depth and was reapproximated with 3-0 vicryl. The skin was closed with 4-0 monocryl. The patient tolerated the procedure well and was taken to the recovery room in stable condition. All sponge, needle and instrument counts were correct x2.     Dr. Naylor was present for the entire procedure.     Joyce Chinchilla   Ob/Gyn resident physician  1/11/2017 8:12 AM    I was present and scrubbed throughout the procedure,  I agree with the note above  Estefany Naylor MD

## 2017-01-11 NOTE — BRIEF OP NOTE
[facility]   Brief Operative Note     Surgery Date: 2017    Surgeon:  Estefany Naylor MD    Assistants:  Joyce Chinchilla MD, PGY-3    Pre-op Diagnosis:  1. Intrauterine pregnancy at 41w1d     2. Category II FHT RFD      3. Suspected macrosomia    Post-op Diagnosis:  1. Same      2. Liveborn male infant     Procedure:  Primary low-transverse  section with double layer uterine closure via Pfannenstiel incision    Anesthesia: Epidural with conversion to general anesthesia     EBL:  800 mL    IVF:  1400 mL crystalloid    UOP:  300 mL clear yellow urine at the end of the case    Drains: Beckham Catheter     Specimens:  None    Complications: None apparent    Findings:   1. No adhesions  2. Clear amniotic fluid  3. Liveborn male infant in occiput posterior presentation. Apgars 3 at 1 minute, 6 at 5 minutes, 8 at 10 minutes. Weight 9 lb 8oz.  4. Arterial cord pH 7.29, base deficit 2.1. Venous cord pH 7.31, base deficit 1.5.  5. Normal uterus, fallopian tubes, and ovaries.     Disposition:  Transferred in stable condition to [facility]    Dr. Naylor was present for the entire procedure.    Joyce Chinchilla MD PGY3  Department of OB/GYN  2017 6:58 AM

## 2017-01-12 LAB — HGB BLD-MCNC: 8 G/DL (ref 11.7–15.7)

## 2017-01-12 PROCEDURE — 85018 HEMOGLOBIN: CPT | Performed by: OBSTETRICS & GYNECOLOGY

## 2017-01-12 PROCEDURE — 25000132 ZZH RX MED GY IP 250 OP 250 PS 637: Performed by: OBSTETRICS & GYNECOLOGY

## 2017-01-12 PROCEDURE — 12000030 ZZH R&B OB INTERMEDIATE UMMC

## 2017-01-12 PROCEDURE — 36415 COLL VENOUS BLD VENIPUNCTURE: CPT | Performed by: OBSTETRICS & GYNECOLOGY

## 2017-01-12 PROCEDURE — 25000125 ZZHC RX 250: Performed by: OBSTETRICS & GYNECOLOGY

## 2017-01-12 PROCEDURE — 25800025 ZZH RX 258: Performed by: OBSTETRICS & GYNECOLOGY

## 2017-01-12 PROCEDURE — 25000130 H RX MED GY IP 250 OP 259 PS 637: Performed by: OBSTETRICS & GYNECOLOGY

## 2017-01-12 PROCEDURE — 25000125 ZZHC RX 250: Performed by: STUDENT IN AN ORGANIZED HEALTH CARE EDUCATION/TRAINING PROGRAM

## 2017-01-12 RX ORDER — FERROUS GLUCONATE 324(38)MG
324 TABLET ORAL
Qty: 100 TABLET | Refills: 3 | Status: SHIPPED | OUTPATIENT
Start: 2017-01-12 | End: 2017-02-13

## 2017-01-12 RX ORDER — OXYCODONE HYDROCHLORIDE 5 MG/1
5-10 TABLET ORAL
Qty: 30 TABLET | Refills: 0 | Status: ON HOLD | OUTPATIENT
Start: 2017-01-12 | End: 2017-01-20

## 2017-01-12 RX ORDER — IBUPROFEN 400 MG/1
400-800 TABLET, FILM COATED ORAL EVERY 6 HOURS PRN
Qty: 60 TABLET | Refills: 0 | Status: ON HOLD | OUTPATIENT
Start: 2017-01-12 | End: 2017-01-19

## 2017-01-12 RX ORDER — ACETAMINOPHEN 325 MG/1
650 TABLET ORAL EVERY 4 HOURS PRN
Qty: 60 TABLET | Refills: 3 | Status: SHIPPED | OUTPATIENT
Start: 2017-01-14 | End: 2017-02-13

## 2017-01-12 RX ORDER — AMOXICILLIN 250 MG
1-2 CAPSULE ORAL 2 TIMES DAILY
Qty: 60 TABLET | Refills: 0 | Status: ON HOLD | OUTPATIENT
Start: 2017-01-12 | End: 2017-01-19

## 2017-01-12 RX ADMIN — OXYCODONE HYDROCHLORIDE 10 MG: 5 TABLET ORAL at 15:53

## 2017-01-12 RX ADMIN — OXYCODONE HYDROCHLORIDE 5 MG: 5 TABLET ORAL at 00:41

## 2017-01-12 RX ADMIN — Medication 1 LOZENGE: at 18:38

## 2017-01-12 RX ADMIN — SENNOSIDES AND DOCUSATE SODIUM 1 TABLET: 8.6; 5 TABLET ORAL at 08:01

## 2017-01-12 RX ADMIN — ACETAMINOPHEN 975 MG: 325 TABLET, FILM COATED ORAL at 09:25

## 2017-01-12 RX ADMIN — ACETAMINOPHEN 975 MG: 325 TABLET, FILM COATED ORAL at 18:35

## 2017-01-12 RX ADMIN — ACETAMINOPHEN 975 MG: 325 TABLET, FILM COATED ORAL at 02:19

## 2017-01-12 RX ADMIN — IBUPROFEN 800 MG: 400 TABLET ORAL at 19:50

## 2017-01-12 RX ADMIN — SENNOSIDES AND DOCUSATE SODIUM 2 TABLET: 8.6; 5 TABLET ORAL at 21:23

## 2017-01-12 RX ADMIN — OXYCODONE HYDROCHLORIDE 5 MG: 5 TABLET ORAL at 18:35

## 2017-01-12 RX ADMIN — NALBUPHINE HYDROCHLORIDE 2.5 MG: 10 INJECTION, SOLUTION INTRAMUSCULAR; INTRAVENOUS; SUBCUTANEOUS at 09:09

## 2017-01-12 RX ADMIN — KETOROLAC TROMETHAMINE 30 MG: 30 INJECTION, SOLUTION INTRAMUSCULAR at 00:33

## 2017-01-12 RX ADMIN — SODIUM CHLORIDE, POTASSIUM CHLORIDE, SODIUM LACTATE AND CALCIUM CHLORIDE: 600; 310; 30; 20 INJECTION, SOLUTION INTRAVENOUS at 15:54

## 2017-01-12 RX ADMIN — IBUPROFEN 800 MG: 400 TABLET ORAL at 06:56

## 2017-01-12 RX ADMIN — OXYCODONE HYDROCHLORIDE 5 MG: 5 TABLET ORAL at 06:56

## 2017-01-12 RX ADMIN — DIPHENHYDRAMINE HYDROCHLORIDE 25 MG: 25 CAPSULE ORAL at 04:23

## 2017-01-12 RX ADMIN — OXYCODONE HYDROCHLORIDE 10 MG: 5 TABLET ORAL at 21:23

## 2017-01-12 RX ADMIN — OXYCODONE HYDROCHLORIDE 10 MG: 5 TABLET ORAL at 12:23

## 2017-01-12 RX ADMIN — IBUPROFEN 800 MG: 400 TABLET ORAL at 13:30

## 2017-01-12 NOTE — PROGRESS NOTES
[facility]   Post-partum Note    Name:  Claudine Wells  MRN: [id number]    S: Patient is very tired and would like to go back to sleep.  Pain is well controlled.  Tolerating regular diet without nausea or vomiting.  Ambulating without dizziness.  Lochia is wnl.  Breast and bottle feeding. Will discuss contraception when patient is more awake.    O:   Patient Vitals for the past 24 hrs:   BP Temp Temp src Heart Rate Resp SpO2   01/11/17 2330 101/62 mmHg 98.1  F (36.7  C) Oral 91 18 99 %   01/11/17 1900 108/55 mmHg 98.4  F (36.9  C) Oral 80 16 100 %   01/11/17 1500 117/47 mmHg 99.1  F (37.3  C) Oral 84 16 99 %   01/11/17 1400 121/54 mmHg - - 83 - 98 %   01/11/17 1300 120/69 mmHg - - 87 - 97 %   01/11/17 1200 123/55 mmHg - - 86 - 97 %   01/11/17 1130 119/65 mmHg - - 79 - 99 %   01/11/17 1100 127/58 mmHg - - 72 - 98 %   01/11/17 1030 116/67 mmHg - - 73 - 98 %   01/11/17 1015 117/70 mmHg - - 74 - 98 %   01/11/17 1000 116/67 mmHg - - 71 16 98 %   01/11/17 0945 123/67 mmHg - - 95 16 98 %   01/11/17 0930 119/74 mmHg 98.3  F (36.8  C) Oral 70 16 98 %   01/11/17 0900 118/64 mmHg 97.6  F (36.4  C) Axillary 73 15 98 %   01/11/17 0845 115/60 mmHg - - 72 16 98 %   01/11/17 0830 114/68 mmHg - - 72 16 -   01/11/17 0815 108/64 mmHg 97.6  F (36.4  C) Axillary 78 15 -   01/11/17 0805 107/74 mmHg - - 96 14 100 %   01/11/17 0800 115/62 mmHg - - 75 16 100 %   01/11/17 0745 118/64 mmHg 97.7  F (36.5  C) Axillary 74 16 100 %   01/11/17 0730 110/57 mmHg - - 85 16 100 %   01/11/17 0720 116/54 mmHg 97.7  F (36.5  C) Axillary 84 16 100 %   01/11/17 0431 98/55 mmHg - - - - -   01/11/17 0401 94/55 mmHg - - - - -     Gen:  Resting comfortably, NAD  Pulm:  Non-labored breathing  Abd:  Soft, appropriately ttp, non-distended.Fundus 1cm below umbilicus, firm and non-tender.  Ext:  non-tender, no LE edema b/l    I/O last 3 completed shifts:  In: 3512.77 [P.O.:830; I.V.:2682.77]  Out: 2400 [Urine:2400]    UOP 50 cc/hr  overnight    Hgb:   HEMOGLOBIN   Date Value Ref Range Status   01/10/2017 11.4* 11.7 - 15.7 g/dL Final       Assessment/Plan:  22 year old  on POD #1 s/p PLTCS for category II remote from delivery. Doing well.  - continue with routine postpartum management  Pain: Well-controlled with toradol, tylenol and PRN mackenzie  Hgb: 11.4>> AM pending  Rh: positive  Rubella: immune  Feed: Breast and bottle  BC: Will discuss when patient is more awake  Dispo: DC POD#3    Joyce Chinchilla MD  Ob/Gyn, PGY-3  2017    I have seen and examined the patient without the resident. I have reviewed, edited, and agree with the note.   My findings are: Appears well.   Abdomen: soft, NT, ND.   Incision CDI Bandage clean and dry  LE pneumoboots  HEMOGLOBIN   Date Value Ref Range Status   2017 8.0* 11.7 - 15.7 g/dL Final   01/10/2017 11.4* 11.7 - 15.7 g/dL Final       POD 1  Acute Blood Loss anemia    Amy Jefferson MD

## 2017-01-12 NOTE — DISCHARGE SUMMARY
[facility] Discharge Summary    Claudine Wells MRN# [id number]   Age: 22 year old YOB: 1995     Date of Admission:  1/10/2017  Date of Discharge:  2017  Admitting Physician:  Estefany Naylor MD  Discharge Physician:  Amy Jefferson MD    Admit Dx:   - Intrauterine pregnancy at 41w1d   - Suspected fetal macrosomia    Discharge Dx:  - Same as above, s/p primary low transverse  section d/t Category II FHT RFD  - Prolonged labor course  - Expected acute blood loss anemia    Procedures:  - Primary low transverse  section with double layer uterine closure via Pfannenstiel incision  - Spinal analgesia with conversion to GETA prior to procedure    Admit HPI:  Claudine Wells is a 22 year old, , who was admitted to the [facility] service at 41w1d PROM. She was augmented with pitocin. During her labor course she intermittently had periods of minimal variability with recurrent late decelerations. These initially improved with intrauterine resuscitation (fluid bolus, repositioning, etc). She slowly progressed to 5 centimeters. However, she continued to have minimal variability and periods of recurrent late decelerations, which became minimally responsive to resuscitative efforts. Due to category II FHT remote from delivery, a  section was recommended. The risks, benefits, and alternatives were discussed. Risks include infection, bleeding, and injury to intraabdominal structures. We discussed the measures we take to decrease the chance of these . Informed consent was obtained.    Please see her admit H&P for full details of her PMH, PSH, Meds, Allergies and exam on admit.    Operative Course:  Surgery was uncomplicated. EBL from the delivery was 800 mL. Please see her  Section Operative Note for full details regarding her delivery.    Operative Findings:   1. No adhesions  2. Clear amniotic fluid  3. Liveborn male infant in occiput posterior  presentation. Apgars 3 at 1 minute, 6 at 5 minutes, 8 at 10 minutes. Weight 9 lb 8oz.  4. Arterial cord pH 7.29, base deficit 2.1. Venous cord pH 7.31, base deficit 1.5.  5. Normal uterus, fallopian tubes, and ovaries.    Postoperative Course:  Her postoperative course was uncomplicated. On POD#3, she was meeting all of her postpartum goals and deemed stable for discharge. She was voiding without difficulty, tolerating a regular diet without nausea and vomiting, her pain was well controlled on oral pain medicines and her lochia was appropriate. Her hemoglobin prior to delivery was 11.4 and after delivery was 8.0. Her Rh status was positive and Rhogam was not indicated.    Discharge Medications:  Discharge Medication List as of 1/14/2017 12:39 PM      START taking these medications    Details   oxyCODONE (ROXICODONE) 5 MG IR tablet Take 1-2 tablets (5-10 mg) by mouth every 3 hours as needed for moderate to severe pain, Disp-30 tablet, R-0, Local Print      !! acetaminophen (TYLENOL) 325 MG tablet Take 2 tablets (650 mg) by mouth every 4 hours as needed for other (surgical pain), Disp-60 tablet, R-3, E-Prescribe      ibuprofen (ADVIL/MOTRIN) 400 MG tablet Take 1-2 tablets (400-800 mg) by mouth every 6 hours as needed for other (cramping), Disp-60 tablet, R-0, E-Prescribe      senna-docusate (SENOKOT-S;PERICOLACE) 8.6-50 MG per tablet Take 1-2 tablets by mouth 2 times daily, Disp-60 tablet, R-0, E-Prescribe      ferrous gluconate (FERGON) 324 (38 FE) MG tablet Take 1 tablet (324 mg) by mouth daily (with breakfast), Disp-100 tablet, R-3, E-Prescribe       !! - Potential duplicate medications found. Please discuss with provider.      CONTINUE these medications which have NOT CHANGED    Details   !! acetaminophen (TYLENOL) 325 MG tablet Take 2 tablets (650 mg) by mouth every 4 hours as needed for mild pain, Disp-30 tablet, R-0, E-PrescribeTake 1-2 tablets every 4 hours as needed for discomfort. Avoid taking if not  necessary. Do not take more than 4 grams per day.      Prenatal Vit-Fe Fumarate-FA (GNP PRENATAL VITAMINS) 28-0.8 MG TABS Take 1 tablet by mouth daily, Disp-100 tablet, R-3, E-Prescribe      Azelaic Acid 15 % gel Massage thin film gently into afected areas of the face twice a day morning and evening.Disp-50 g, [id number]      cholecalciferol (VITAMIN D3) 5000 UNITS CAPS capsule Take 1 capsule (5,000 Units) by mouth daily Take one capsule daily., Disp-90 capsule, R-3, E-Prescribe       !! - Potential duplicate medications found. Please discuss with provider.            Discharge/Disposition:  Claudine Wells was discharged to home in stable condition with the following instructions/medications:  1) Call for temperature > 100.4, bright red vaginal bleeding >1 pad an hour x 2 hours, foul smelling vaginal discharge, pain not controlled by usual oral pain meds, persistent nausea and vomiting not controlled on medications, drainage or redness from incision site  2) She declined contraception.  3) For feeding she decided to breast and bottlefeed.  4) She was instructed to follow-up with her primary OB in 6 weeks for a routine postpartum visit.  5) Discharge activity:  No heavy lifting >15 lbs or strenuous activity for 6 weeks, pelvic rest for 6 weeks, no driving or operating machinery while on narcotics.    Charlene Arriaza MD  OB/GYN Resident, PGY-2  1/15/2017 7:02 PM

## 2017-01-12 NOTE — PLAN OF CARE
Problem: Goal Outcome Summary  Goal: Goal Outcome Summary  Outcome: Improving  Data: Vital signs within normal limits. Postpartum checks within normal limits - see flow record. Patient eating and drinking normally. Patient able to empty bladder independently and is up ambulating. No apparent signs of infection. Incision healing well. Patient performing self cares and is able to care for infant. Itchy.  Has many questions and requests.   Action: Patient medicated during the shift for pain. See MAR. Patient reassessed within 1 hour after each medication and pain was improved - patient stated she was comfortable. Nubain given for itchiness. Patient education done about breastfeeding. See flow record.  Response: Positive attachment behaviors observed with infant. Support persons Mother present.   Plan: Anticipate discharge on POD  # 3.

## 2017-01-12 NOTE — PLAN OF CARE
Problem: Goal Outcome Summary  Goal: Goal Outcome Summary  Outcome: Improving  :Up to bathroom to have sponge bath and do oral care tolerated well. Continues to have itching given Benadryl lessoned a little. Pain med's adequate for pain control per Pt.  Breast feeding attempted no latch. Hand expression done and will attempt again later.

## 2017-01-12 NOTE — ADDENDUM NOTE
Addendum  created 01/12/17 1527 by Rico Franklin MD    Modules edited: Clinical Notes    Clinical Notes:  File: [id number]

## 2017-01-12 NOTE — PROVIDER NOTIFICATION
Dr. Justin notified via text that pt. Had mild dizziness when up.  Urine since catheter out at 3am was 500cc and was dark and concentrated.  .  P: Awaiting call back.

## 2017-01-12 NOTE — PLAN OF CARE
Problem: Goal Outcome Summary  Goal: Goal Outcome Summary  Outcome: Improving  VS WNL, moving easily in bed, carey draining lt tasia urine, encouraged to rink. Carey D/C. Pain med's offered when she can have them, Benadryl for itching. Breast feeding improving with full assist from nurse with latch.

## 2017-01-12 NOTE — PROVIDER NOTIFICATION
[facility]  MATERNAL CHILD HEALTH   SOCIAL WORK ASSESSMENT    DATA:     Met with pt (Claudine Wells / LOS 1995 / contact [phone]), baby, and her mother bedside in her room on [facility] to assess needs and offer support. Baby is pt's first child. FOB is not involved. Pt lives with her parents in [place], [place]. Pt identifies her primary social support as her parents and siblings. Family report having reliable access to personal transportation.    Pt is currently unemployed and plans to stay home to care for her baby at this time.    Family has not yet decided on a PCP for baby. Family told SW that they have appropriate baby supplies ready at home. Pt plans to breast feed baby. Family plans for baby to be insured through [employer]. Pt expressed feeling well informed about her and baby's care needs; pt told SW that she continues to proactively engage the medical team to ask questions and to learn about her discharge plan.    Pt denies a significant mental health history. Pt endorses stable mood now and throughout the pregnancy. SW provided education about postpartum mood and anxiety disorders.    INTERVENTION:     Introduced self and SW role. Chart review. Initial psychosocial assessment completed. SW provided supportive counseling and appropriate resources related to the impact of this hospitalization on pt and her family system. SW provided education on postpartum mood and anxiety disorders. SW shared information about hospital and community resources, including PPSM. SW provided emotional support and active listening. Provided SW contact information and encouraged pt to proactively access SW for supportive counseling and resources.    ASSESSMENT:     Pt appeared tired and was easily distracted by side conversations with her mother throughout SW visit. Pt asked many questions about her treatment plan during SW bedside visit; pt was focused on managing her  pain better prior to discharge. Pt appears to be well supported by her parents and was observed by this SW to be attentive to baby throughout this afternoons visit. Pt expressed feeling well equipped to care for herself and baby upon discharge. Pt denied any current fears or concerns for her safety. Pt aware of ongoing SW supportive services.    PLAN:     SW will continue to assess needs and provide ongoing psychosocial support and access to resources. SW will continue to collaborate with the multidisciplinary team.    Chika Roberts, ELIE, [facility]  Clinical   Maternal Child Health  [facility]  Phone:   [phone]  Pager:    [phone]

## 2017-01-12 NOTE — PROGRESS NOTES
"Post  Anesthesia Follow Up Note    Patient: Claudine Wells    Patient location: Postpartum floor.    Chief complaint: Acute postoperative pain managment    Procedure(s) Performed:   SECTION    Anesthesia type: General and transversus abdominus plane (TAP) nerve block      Subjective:   The patient reports good pain control.  Pain Intensity: 3/10. Denies weakness, paresthesia, numbness or tingling lips, tinnitus, metallic taste, difficulties breathing or voiding, nausea or vomiting. She is able to ambulate and tolerates regular diet.      Objective:  Respiratory Function (RR / SpO2 / Airway Patency): Satisfactory    Cardiac Function (HR / Rhythm / BP): Satisfactory    Strength and sensation lower extremities: Strength 5/5 and grossly symmetric bilateral LE    Site of spinal/epidural insertion: clean and intact, mild tenderness, no swelling or erythema    Last Vitals: /51 mmHg  Pulse 109  Temp(Src) 36.8  C (98.2  F) (Oral)  Resp 18  Ht 1.676 m (5' 6\")  Wt 116.574 kg (257 lb)  BMI 41.50 kg/m2  SpO2 98%  LMP 2016 (Approximate)  Breastfeeding? Unknown      Assessment and Plan:   Claudine Wells is a 22 year old female POD # 1 s/p   SECTION with IT morphine and single shot TAP nerve block injections with liposome bupivacaine (Exparel) given for postoperative analgesia.  Pt is ambulating without difficulty, no weakness or paresthesias. No evidence of adverse side effects associated with spinal and nerve block injections. Pt is receiving adequate incisional pain control.  Anticipate up to 72 hours of incisional pain control.  Anticipate patient will require opioid/nonopioid analgesics for visceral and muscle pain not controlled with local anesthetic.      - NO other local anesthetic use within 96 hours of liposome bupivacaine (Exparel) long acting  - patient received verbal and written instructions about liposome bupivacaine   - please call if questions or concerns  - discussed plan " with attending anesthesiologist    Rico Brennan MD  Anesthesiology Resident   [phone]    If questions or concerns, please contact OB Anesthesiologist  Phone [phone]          Agreee    Dr. Lombardo

## 2017-01-12 NOTE — PROGRESS NOTES
"Courtesy Round by CNM    Upon arrival in room, Claudine sitting up in chair by window; her mother brushing her hair. States she is feeling well and is happy and excited about the birth of her baby boy, Lake. Both mother and patient express that although the csection was \"scary,\" they are \"very happy now.\" Pt feels well supported by family; was video chatting with her sister. States that her pain is well controlled by medication and that she has ambulated and showered. She does report itching; RN plans to give nubain. Baby is doing well, feeding appropriately.     RAQUEL Izquierdo, FERDINAND  "

## 2017-01-13 LAB — HGB BLD-MCNC: 8 G/DL (ref 11.7–15.7)

## 2017-01-13 PROCEDURE — 36416 COLLJ CAPILLARY BLOOD SPEC: CPT | Performed by: OBSTETRICS & GYNECOLOGY

## 2017-01-13 PROCEDURE — 25000130 H RX MED GY IP 250 OP 259 PS 637: Performed by: OBSTETRICS & GYNECOLOGY

## 2017-01-13 PROCEDURE — 25000132 ZZH RX MED GY IP 250 OP 250 PS 637: Performed by: OBSTETRICS & GYNECOLOGY

## 2017-01-13 PROCEDURE — 25800025 ZZH RX 258: Performed by: OBSTETRICS & GYNECOLOGY

## 2017-01-13 PROCEDURE — 12000030 ZZH R&B OB INTERMEDIATE UMMC

## 2017-01-13 PROCEDURE — 85018 HEMOGLOBIN: CPT | Performed by: OBSTETRICS & GYNECOLOGY

## 2017-01-13 RX ADMIN — DIPHENHYDRAMINE HYDROCHLORIDE 25 MG: 25 CAPSULE ORAL at 00:37

## 2017-01-13 RX ADMIN — IBUPROFEN 800 MG: 400 TABLET ORAL at 23:05

## 2017-01-13 RX ADMIN — SENNOSIDES AND DOCUSATE SODIUM 2 TABLET: 8.6; 5 TABLET ORAL at 21:37

## 2017-01-13 RX ADMIN — IBUPROFEN 800 MG: 400 TABLET ORAL at 10:05

## 2017-01-13 RX ADMIN — Medication 1 LOZENGE: at 19:47

## 2017-01-13 RX ADMIN — OXYCODONE HYDROCHLORIDE 10 MG: 5 TABLET ORAL at 20:48

## 2017-01-13 RX ADMIN — SODIUM CHLORIDE, POTASSIUM CHLORIDE, SODIUM LACTATE AND CALCIUM CHLORIDE 1000 ML: 600; 310; 30; 20 INJECTION, SOLUTION INTRAVENOUS at 07:29

## 2017-01-13 RX ADMIN — SIMETHICONE CHEW TAB 80 MG 80 MG: 80 TABLET ORAL at 10:05

## 2017-01-13 RX ADMIN — OXYCODONE HYDROCHLORIDE 10 MG: 5 TABLET ORAL at 00:36

## 2017-01-13 RX ADMIN — ACETAMINOPHEN 975 MG: 325 TABLET, FILM COATED ORAL at 10:48

## 2017-01-13 RX ADMIN — OXYCODONE HYDROCHLORIDE 5 MG: 5 TABLET ORAL at 16:54

## 2017-01-13 RX ADMIN — IBUPROFEN 800 MG: 400 TABLET ORAL at 02:10

## 2017-01-13 RX ADMIN — SENNOSIDES AND DOCUSATE SODIUM 2 TABLET: 8.6; 5 TABLET ORAL at 10:05

## 2017-01-13 RX ADMIN — IBUPROFEN 800 MG: 400 TABLET ORAL at 16:54

## 2017-01-13 RX ADMIN — SIMETHICONE CHEW TAB 80 MG 80 MG: 80 TABLET ORAL at 21:37

## 2017-01-13 RX ADMIN — OXYCODONE HYDROCHLORIDE 10 MG: 5 TABLET ORAL at 03:37

## 2017-01-13 RX ADMIN — OXYCODONE HYDROCHLORIDE 10 MG: 5 TABLET ORAL at 06:34

## 2017-01-13 RX ADMIN — ACETAMINOPHEN 975 MG: 325 TABLET, FILM COATED ORAL at 02:35

## 2017-01-13 RX ADMIN — OXYCODONE HYDROCHLORIDE 5 MG: 5 TABLET ORAL at 10:05

## 2017-01-13 RX ADMIN — ACETAMINOPHEN 975 MG: 325 TABLET, FILM COATED ORAL at 19:47

## 2017-01-13 NOTE — PROGRESS NOTES
FERDINAND Courtesy Round    Pt up ambulating in hallway.  Encouraged her to continue to do this regularly to help prevent gas pain, constipation.  Pt requesting pain medication. Let RN know this.  Pt also complains of sore throat. Again, notified nurse of this.  Pt asking about discharge. Explained she would likely go tomorrow but would be evaluated first.     RAQUEL St CNM

## 2017-01-13 NOTE — PLAN OF CARE
Problem: Goal Outcome Summary  Goal: Goal Outcome Summary  Outcome: Improving  Vitals are stable, breastfeeding is improving with nipple shield, voiding without difficulty, taking Ibuprofen, Tylenol, and Damaris for pain. Continue with plan of care.

## 2017-01-13 NOTE — PLAN OF CARE
Problem: Goal Outcome Summary  Goal: Goal Outcome Summary  Outcome: Therapy, progress towards functional goals is fair  Data: Vital signs within normal limits. Postpartum checks within normal limits - see flow record. Patient eating and drinking normally. Patient able to empty bladder. No apparent signs of infection. Incision healing well. Needs encouragement with independence in caring for infant and moving/going to the bathroom etc.. Patient has been tachycardic overnight: 0030 HR: 118, 0150 HR: 124-resident paged, 0630 HR: 136-spoke to resident in person. IV flush was not successful; resistance present and patient was in pain when nurse attempted flush at 0630. New IV placed at 0710.  Action: Patient medicated during the shift for pain. See MAR. Patient reassessed within 1 hour after each medication and pain was improved - patient stated that pain is not getting any better, nurse assisted patient to change positions which has helped to relieve pain. Patient also medicated for itchiness with Benadryl.  Patient education done about signs of hunger and how to calm baby when he is fussy.   Response: Positive attachment behaviors observed with infant. Patient's mother was present overnight.  Plan: Continue plan of care. Strict monitoring of I/O q4hr.

## 2017-01-13 NOTE — PROGRESS NOTES
[facility]   Post-partum Note    Name:  Claudine Wells  MRN: [id number]    S: Patient is doing well. Notified at 0640 of HR of 134. Elevated to 120's overnight, however MD team was not aware, notified at 0640. Patient denies CP, SOB. Pain is well controlled.  Tolerating regular diet without nausea or vomiting.  Ambulating without dizziness.  Lochia is wnl.  Breast and feeding. Declines contraception.    O:   Patient Vitals for the past 24 hrs:   BP Temp Temp src Pulse Heart Rate Resp SpO2   17 0745 115/59 mmHg 97.7  F (36.5  C) Oral 105 - 18 -   17 0730 113/51 mmHg - - 118 - - -   17 0630 110/70 mmHg - - 136 - - -   17 0149 107/59 mmHg - - 124 - - -   17 0030 98/54 mmHg 98.1  F (36.7  C) Oral 118 - 16 -   17 2123 100/58 mmHg 98  F (36.7  C) Oral 109 - - -   17 1600 124/57 mmHg 98.1  F (36.7  C) Oral - 108 16 -   17 1400 109/51 mmHg - - - 113 18 98 %     Gen:  Resting comfortably, NAD  Pulm:  Non-labored breathing  Abd:  Soft, appropriately ttp, non-distended.Fundus 1cm below umbilicus, firm and non-tender.  Inc:       Pfannenstiel, Steristrips in place, C/D/I  Ext:  non-tender, no LE edema b/l    No I/O recorded overnight  Hgb:   HEMOGLOBIN   Date Value Ref Range Status   2017 8.0* 11.7 - 15.7 g/dL Final       Assessment/Plan:  22 year old  on POD #2 s/p PLTCS for category II remote from delivery. Now with tachycardia. Suspect acute blood loss anemia.   - STAT hemoglobin  - Replace IV  - 1L IV fluid bolus  - Q4h I/O  - Closely monitor HR. If not improving or not explained by anemia, would further investigate with PE study.  - continue with routine postpartum management  Pain: Well-controlled with ibuprofen, tylenol and PRN mackenzie  Hgb: 11.4>>8.0>8.0, tachycardic, acute blood loss anemia secondary to surgical blood loss, tachycardia improved with fluid bolus, patient denies dizziness with ambulation, will continue to monitor  closely  Rh: positive  Rubella: immune  Feed: Breast and bottle  BC: Declines  Dispo: DC POD#3 when meeting goals    Joyce Chinchilla MD  Ob/Gyn, PGY-3  1/13/2017    Staff MD Note    I appreciate the note by Dr. Chinchilla.  Any necessary changes have been made by me.  I evaluated the patient with the resident and agree with the assessment and plan.    Jocelyn Abdalla MD

## 2017-01-13 NOTE — PLAN OF CARE
Problem: Goal Outcome Summary  Goal: Goal Outcome Summary  Outcome: No Change  Pt. Pain controlled with scheduled tyl, prn IBU and oxycodone.  Pt. Watches white board and calls for her medication.  Pt. Requests help with all personal tasks reinforcement of movement being important factor to positive outcomes s/p .  Pt. Requesting help via call light 10+ times this shift.

## 2017-01-14 VITALS
DIASTOLIC BLOOD PRESSURE: 65 MMHG | WEIGHT: 257 LBS | HEIGHT: 66 IN | RESPIRATION RATE: 18 BRPM | TEMPERATURE: 99 F | OXYGEN SATURATION: 100 % | BODY MASS INDEX: 41.3 KG/M2 | SYSTOLIC BLOOD PRESSURE: 118 MMHG | HEART RATE: 104 BPM

## 2017-01-14 PROCEDURE — 25000132 ZZH RX MED GY IP 250 OP 250 PS 637: Performed by: OBSTETRICS & GYNECOLOGY

## 2017-01-14 RX ADMIN — OXYCODONE HYDROCHLORIDE 10 MG: 5 TABLET ORAL at 05:40

## 2017-01-14 RX ADMIN — OXYCODONE HYDROCHLORIDE 5 MG: 5 TABLET ORAL at 14:34

## 2017-01-14 RX ADMIN — OXYCODONE HYDROCHLORIDE 10 MG: 5 TABLET ORAL at 00:14

## 2017-01-14 RX ADMIN — SENNOSIDES AND DOCUSATE SODIUM 2 TABLET: 8.6; 5 TABLET ORAL at 09:07

## 2017-01-14 RX ADMIN — OXYCODONE HYDROCHLORIDE 5 MG: 5 TABLET ORAL at 09:07

## 2017-01-14 RX ADMIN — ACETAMINOPHEN 650 MG: 325 TABLET, FILM COATED ORAL at 11:19

## 2017-01-14 RX ADMIN — OXYCODONE HYDROCHLORIDE 5 MG: 5 TABLET ORAL at 11:19

## 2017-01-14 RX ADMIN — IBUPROFEN 800 MG: 400 TABLET ORAL at 09:07

## 2017-01-14 RX ADMIN — ACETAMINOPHEN 975 MG: 325 TABLET, FILM COATED ORAL at 03:35

## 2017-01-14 NOTE — PLAN OF CARE
Problem: Discharge Planning  Goal: Discharge Planning (Adult, OB, Behavioral, Peds)  Outcome: Adequate for Discharge Date Met:  17  Postpartum discharge instructions and medications reviewed with family, and family verbalized understanding. BF  on demand and pumping after feeds. Pt has ample supply of breast milk. Family support readily available. Mother and baby discharged to home.

## 2017-01-14 NOTE — PROGRESS NOTES
Post Partum Progress Note    Subjective:  She is resting comfortably in bed this morning.  Complains of ***. Pain is improving and well controlled on current medication regimen. Tolerating PO intake.  Lochia present and ***.  Voiding without difficulty.  Passing flatus/BM***. Ambulating without dizziness or difficulty.  She denies headache, changes in vision, nausea/vomiting, chest pain, shortness of breath, RUQ pain, or worsening edema.  Plans to breastfeed.    Objective:  Filed Vitals:    17 0730 17 0745 17 1530 17 2306   BP: 113/51 115/59 115/59 109/50   Pulse: 118 105 113 103   Temp:  97.7  F (36.5  C) 98.3  F (36.8  C) 98.4  F (36.9  C)   TempSrc:  Oral Oral Oral   Resp:  18 18 18   Height:       Weight:       SpO2:   100%      I/O last 3 completed shifts:  In:  [P.O.:]  Out: 3850 [Urine:3850]    General: NAD. A&Ox3.  CV: RRR.  Pulm: CTAB. Normal respiratory effort.  Abd: Soft, non-tender, non-distended. Fundus is firm and below the umbilicus.  Incision c/d/i. ***  Ext: *** edema. No calf tenderness.    Assessment/Plan:  Claudine Wells is a 22 year old  female who is POD#3 s/p PLTCS for category II remote from delivery.  Tachycardic, likely acute blood loss anemia.  Received 1L IVF bolus but still tachycardic.  Could investigate with PE study (ECG, LE Doppler, CT) if continues to be tachycardic unresponsive to IVF.      - Encourage routine post-operative goals including ambulation and incentive spirometry  - PNC: Rh positive. Rubella immune. No intervention indicated.  - Pain: Well-controlled with ibuprofen, tylenol and PRN mackenzie  - Heme: Hgb 11.4> >8.0>8.0,  Tachycardic, likely acute blood loss anemia secondary to surgical blood loss, tachycardia improved with fluid bolus but still tachycardic, patient *** denies dizziness with ambulation, will continue to monitor closely.    - GI: continue anti-emetics and stool softeners as needed.  - : Beckham in place ***. Voiding  spontaneously ***.  - Infant: Stable in ***  - Feeding: Plans on breastfeeding and bottle feeding.  - BC: Declines    Discharge to home on POD#3 when meeting goals.       Scribe Disclosure:   I, Lakeisha Barnett, am serving as a scribe; to document services personally performed by Dr. Jarvis -based on data collection and the provider's statements to me.

## 2017-01-14 NOTE — PLAN OF CARE
Problem: Goal Outcome Summary  Goal: Goal Outcome Summary  Outcome: Improving  Pain meds given for incisional pain, minimal relief obtained. Dried blood noted on incisional steri strips, interdry applied. Lungs clear bilaterally. Patient encouraged to ambulate more frequently to help relieve constipation and gas pains. Patient still requiring assistance getting out of bed. Breastfeeding baby with nipple shield on cue, patient requests help latching baby with each feed. Good latch noted for each feed. Patient requesting to pump after feeds to relieve engorgement discomfort, good amount of colostrum noted from each breast. Encouraged patient to also self massage and express in addition to pumping. Patient encouraged to be more independent with cares, frequent reassurance given throughout shift. Fundus firm and midline, U1. Vss

## 2017-01-14 NOTE — DISCHARGE INSTRUCTIONS
Postop  Birth Instructions    Activity       Do not lift more than 10 pounds for 6 weeks after surgery.  Ask family and friends for help when you need it.    No driving until you have stopped taking your pain medications (usually two weeks after surgery).    No heavy exercise or activity for 6 weeks.  Don't do anything that will put a strain on your surgery site.    Don't strain when using the toilet.  Your care team may prescribe a stool softener if you have problems with your bowel movements.     To care for your incision:       Keep the incision clean and dry.    Do not soak your incision in water. No swimming or hot tubs until it has fully healed. You may soak in the bathtub if the water level is below your incision.    Do not use peroxide, gel, cream, lotion, or ointment on your incision.    Adjust your clothes to avoid pressure on your surgery site (check the elastic in your underwear for example).     You may see a small amount of clear or pink drainage and this is normal.  Check with your health care provider:       If the drainage increases or has an odor.    If the incision reddens, you have swelling, or develop a rash.    If you have increased pain and the medicine we prescribed doesn't help.    If you have a fever above 100.4 F (38 C) with or without chills when placing thermometer under your tongue.   The area around your incision (surgery wound), will feel numb.  This is normal. The numbness should go away in less than a year.     Keep your hands clean:  Always wash your hands before touching your incision (surgery wound). This helps reduce your risk of infection. If your hands aren't dirty, you may use an alcohol hand-rub to clean your hands. Keep your nails clean and short.    Call your healthcare provider if you have any of these symptoms:       You soak a sanitary pad with blood within 1 hour, or you see blood clots larger than a golf ball.    Bleeding that lasts more than 6  weeks.    Vaginal discharge that smells bad.    Severe pain, cramping or tenderness in your lower belly area.    A need to urinate more frequently (use the toilet more often), more urgently (use the toilet very quickly), or it burns when you urinate.    Nausea and vomiting.    Redness, swelling or pain around a vein in your leg.    Problems breastfeeding or a red or painful area on your breast.    Chest pain and cough or are gasping for air.    Problems with coping with sadness, anxiety or depression. If you have concerns about hurting yourself or the baby, call your provider immediately.      You have questions or concerns after you return home.     Follow up in 6 weeks for post delivery check up.  If you are having problems or concern come in sooner.            Postop  Birth Instructions    Activity       Do not lift more than 10 pounds for 6 weeks after surgery.  Ask family and friends for help when you need it.    No driving until you have stopped taking your pain medications (usually two weeks after surgery).    No heavy exercise or activity for 6 weeks.  Don't do anything that will put a strain on your surgery site.    Don't strain when using the toilet.  Your care team may prescribe a stool softener if you have problems with your bowel movements.     To care for your incision:       Keep the incision clean and dry.    Do not soak your incision in water. No swimming or hot tubs until it has fully healed. You may soak in the bathtub if the water level is below your incision.    Do not use peroxide, gel, cream, lotion, or ointment on your incision.    Adjust your clothes to avoid pressure on your surgery site (check the elastic in your underwear for example).     You may see a small amount of clear or pink drainage and this is normal.  Check with your health care provider:       If the drainage increases or has an odor.    If the incision reddens, you have swelling, or develop a rash.    If you have  increased pain and the medicine we prescribed doesn't help.    If you have a fever above 100.4 F (38 C) with or without chills when placing thermometer under your tongue.   The area around your incision (surgery wound), will feel numb.  This is normal. The numbness should go away in less than a year.     Keep your hands clean:  Always wash your hands before touching your incision (surgery wound). This helps reduce your risk of infection. If your hands aren't dirty, you may use an alcohol hand-rub to clean your hands. Keep your nails clean and short.    Call your healthcare provider if you have any of these symptoms:       You soak a sanitary pad with blood within 1 hour, or you see blood clots larger than a golf ball.    Bleeding that lasts more than 6 weeks.    Vaginal discharge that smells bad.    Severe pain, cramping or tenderness in your lower belly area.    A need to urinate more frequently (use the toilet more often), more urgently (use the toilet very quickly), or it burns when you urinate.    Nausea and vomiting.    Redness, swelling or pain around a vein in your leg.    Problems breastfeeding or a red or painful area on your breast.    Chest pain and cough or are gasping for air.    Problems with coping with sadness, anxiety or depression. If you have concerns about hurting yourself or the baby, call your provider immediately.      You have questions or concerns after you return home.

## 2017-01-14 NOTE — PROGRESS NOTES
[facility] Obstetrics Post-Op / Progress Note         Assessment and Plan:    Assessment:   Post-operative day #3  Low transverse primary  section  L&D complications: Term pregnancy with category II FHTs remote from delivery, post operative acute blood loss anemia      Doing well.      Plan:   Ambulation encouraged           Interval History:   Doing well.  Pain is well-controlled.  No fevers.  No history of wound drainage, warmth or significant erythema.  Good appetite.  Denies chest pain, shortness of breath, nausea or vomiting.  Ambulatory.  Breastfeeding well.          Significant Problems:    None          Review of Systems:    The patient denies any chest pain, shortness of breath, excessive pain, fever, chills, purulent drainage from the wound, nausea or vomiting.          Medications:   All medications related to the patient's surgery have been reviewed          Physical Exam:   All vitals stable  Temp: 98.4  F (36.9  C) Temp src: Oral BP: 109/50 mmHg Pulse: 103   Resp: 18 SpO2: 100 %      Wound clean and dry with minimal or no drainage.  Surrounding skin with minimal erythema.          Data:     HEMOGLOBIN   Date Value Ref Range Status   2017 8.0* 11.7 - 15.7 g/dL Final   2017 8.0* 11.7 - 15.7 g/dL Final   01/10/2017 11.4* 11.7 - 15.7 g/dL Final   10/17/2016 11.4* 11.7 - 15.7 g/dL Final   2016 10.7* 11.7 - 15.7 g/dL Final     No imaging studies have been ordered    Amy Jefferson MD

## 2017-01-16 NOTE — OR NURSING
Surgical Record Edited/Adjusted    Surgical encounter record for Claudine Wells ([id number])  Done on: 1/10/2017 - 1/11/2017  The procedure: Procedure(s):   - Wound Class: I-Clean   MD: Estefany Naylor MD     Documentation was done by:  Circulator: Jerri Yanes RN  Scrub Person: Bria Sanz    The original documentation was edited on January 16, 2017 by Maribel Ortiz    Documentation edited includes:Post op event documentation    Purpose for Editing Documentation:Complete surgical log    Edited documentation taken from:Event Management    Signed: Maribel Ortiz , January 16, 2017

## 2017-01-17 ENCOUNTER — TELEPHONE (OUTPATIENT)
Dept: OBGYN | Facility: CLINIC | Age: 22
End: 2017-01-17

## 2017-01-17 NOTE — TELEPHONE ENCOUNTER
"Spoke with Claudine who is post-op one week from . She states that she is having spots of blood coming out of her incision. She states they look like \"old blood\". Denies pus, redness, swelling, fever, chills. States she has been taking her pain medication. She is still feeling pain at her incision site and would like someone to check on it.  Nurse assured that tenderness at incision site is normal for a few weeks after surgery. Also assured that some spots of blood are normal.  Claudine insists on being seen.  Nurse arranged appointment for 2:30pm for patient and patient agreed to come for evaluation.    Patient did not arrive at 2:30pm and then called nurse line and was informed that if she was over 15 minutes late that she would have to reschedule.    Nurse then received call from [place] triage nurse stating that Claudine is calling them with symptoms of infection.  She states she has fever and chills at night.      Nurse will call Claudine to re-schedule her appointment from today that she missed.   "

## 2017-01-18 ENCOUNTER — HOSPITAL ENCOUNTER (INPATIENT)
Facility: CLINIC | Age: 22
LOS: 2 days | Discharge: HOME HEALTH | DRG: 776 | End: 2017-01-20
Attending: OBSTETRICS & GYNECOLOGY | Admitting: OBSTETRICS & GYNECOLOGY
Payer: COMMERCIAL

## 2017-01-18 ENCOUNTER — APPOINTMENT (OUTPATIENT)
Dept: CT IMAGING | Facility: CLINIC | Age: 22
DRG: 776 | End: 2017-01-18
Attending: STUDENT IN AN ORGANIZED HEALTH CARE EDUCATION/TRAINING PROGRAM
Payer: COMMERCIAL

## 2017-01-18 ENCOUNTER — ANESTHESIA (OUTPATIENT)
Dept: SURGERY | Facility: CLINIC | Age: 22
DRG: 776 | End: 2017-01-18
Payer: COMMERCIAL

## 2017-01-18 ENCOUNTER — ANESTHESIA EVENT (OUTPATIENT)
Dept: SURGERY | Facility: CLINIC | Age: 22
DRG: 776 | End: 2017-01-18
Payer: COMMERCIAL

## 2017-01-18 ENCOUNTER — OFFICE VISIT (OUTPATIENT)
Dept: OBGYN | Facility: CLINIC | Age: 22
DRG: 776 | End: 2017-01-18
Attending: OBSTETRICS & GYNECOLOGY
Payer: COMMERCIAL

## 2017-01-18 VITALS
TEMPERATURE: 98.4 F | HEIGHT: 66 IN | WEIGHT: 162.7 LBS | HEART RATE: 105 BPM | SYSTOLIC BLOOD PRESSURE: 106 MMHG | BODY MASS INDEX: 26.15 KG/M2 | DIASTOLIC BLOOD PRESSURE: 72 MMHG

## 2017-01-18 DIAGNOSIS — L08.9 WOUND INFECTION: Primary | ICD-10-CM

## 2017-01-18 DIAGNOSIS — Z98.891 S/P CESAREAN SECTION: ICD-10-CM

## 2017-01-18 DIAGNOSIS — T14.8XXA WOUND INFECTION: Primary | ICD-10-CM

## 2017-01-18 PROBLEM — O36.60X0 LGA (LARGE FOR GESTATIONAL AGE) FETUS AFFECTING MANAGEMENT OF MOTHER: Status: RESOLVED | Noted: 2017-01-03 | Resolved: 2017-01-18

## 2017-01-18 LAB
ERYTHROCYTE [DISTWIDTH] IN BLOOD BY AUTOMATED COUNT: 13.7 % (ref 10–15)
HCT VFR BLD AUTO: 22 % (ref 35–47)
HGB BLD-MCNC: 7.1 G/DL (ref 11.7–15.7)
LACTATE BLD-SCNC: 0.6 MMOL/L (ref 0.7–2.1)
MCH RBC QN AUTO: 27.8 PG (ref 26.5–33)
MCHC RBC AUTO-ENTMCNC: 32.3 G/DL (ref 31.5–36.5)
MCV RBC AUTO: 86 FL (ref 78–100)
PLATELET # BLD AUTO: 270 10E9/L (ref 150–450)
RBC # BLD AUTO: 2.55 10E12/L (ref 3.8–5.2)
WBC # BLD AUTO: 15.1 10E9/L (ref 4–11)

## 2017-01-18 PROCEDURE — 25000125 ZZHC RX 250: Performed by: STUDENT IN AN ORGANIZED HEALTH CARE EDUCATION/TRAINING PROGRAM

## 2017-01-18 PROCEDURE — 25000128 H RX IP 250 OP 636: Performed by: STUDENT IN AN ORGANIZED HEALTH CARE EDUCATION/TRAINING PROGRAM

## 2017-01-18 PROCEDURE — 87075 CULTR BACTERIA EXCEPT BLOOD: CPT | Performed by: OBSTETRICS & GYNECOLOGY

## 2017-01-18 PROCEDURE — 25000125 ZZHC RX 250: Performed by: OBSTETRICS & GYNECOLOGY

## 2017-01-18 PROCEDURE — 87070 CULTURE OTHR SPECIMN AEROBIC: CPT | Performed by: OBSTETRICS & GYNECOLOGY

## 2017-01-18 PROCEDURE — 27210995 ZZH RX 272: Performed by: OBSTETRICS & GYNECOLOGY

## 2017-01-18 PROCEDURE — 37000009 ZZH ANESTHESIA TECHNICAL FEE, EACH ADDTL 15 MIN: Performed by: OBSTETRICS & GYNECOLOGY

## 2017-01-18 PROCEDURE — 40000170 ZZH STATISTIC PRE-PROCEDURE ASSESSMENT II: Performed by: OBSTETRICS & GYNECOLOGY

## 2017-01-18 PROCEDURE — 86850 RBC ANTIBODY SCREEN: CPT | Performed by: STUDENT IN AN ORGANIZED HEALTH CARE EDUCATION/TRAINING PROGRAM

## 2017-01-18 PROCEDURE — 87077 CULTURE AEROBIC IDENTIFY: CPT | Performed by: OBSTETRICS & GYNECOLOGY

## 2017-01-18 PROCEDURE — 37000008 ZZH ANESTHESIA TECHNICAL FEE, 1ST 30 MIN: Performed by: OBSTETRICS & GYNECOLOGY

## 2017-01-18 PROCEDURE — 86900 BLOOD TYPING SEROLOGIC ABO: CPT | Performed by: STUDENT IN AN ORGANIZED HEALTH CARE EDUCATION/TRAINING PROGRAM

## 2017-01-18 PROCEDURE — 25000132 ZZH RX MED GY IP 250 OP 250 PS 637: Performed by: STUDENT IN AN ORGANIZED HEALTH CARE EDUCATION/TRAINING PROGRAM

## 2017-01-18 PROCEDURE — 87076 CULTURE ANAEROBE IDENT EACH: CPT | Performed by: OBSTETRICS & GYNECOLOGY

## 2017-01-18 PROCEDURE — 0J9C0ZX DRAINAGE OF PELVIC REGION SUBCUTANEOUS TISSUE AND FASCIA, OPEN APPROACH, DIAGNOSTIC: ICD-10-PCS | Performed by: OBSTETRICS & GYNECOLOGY

## 2017-01-18 PROCEDURE — 27210794 ZZH OR GENERAL SUPPLY STERILE: Performed by: OBSTETRICS & GYNECOLOGY

## 2017-01-18 PROCEDURE — 36415 COLL VENOUS BLD VENIPUNCTURE: CPT | Performed by: OBSTETRICS & GYNECOLOGY

## 2017-01-18 PROCEDURE — 25500064 ZZH RX 255 OP 636: Performed by: OBSTETRICS & GYNECOLOGY

## 2017-01-18 PROCEDURE — 36415 COLL VENOUS BLD VENIPUNCTURE: CPT | Performed by: STUDENT IN AN ORGANIZED HEALTH CARE EDUCATION/TRAINING PROGRAM

## 2017-01-18 PROCEDURE — 36000059 ZZH SURGERY LEVEL 3 EA 15 ADDTL MIN UMMC: Performed by: OBSTETRICS & GYNECOLOGY

## 2017-01-18 PROCEDURE — 99212 OFFICE O/P EST SF 10 MIN: CPT | Mod: ZF

## 2017-01-18 PROCEDURE — 25800025 ZZH RX 258: Performed by: REGISTERED NURSE

## 2017-01-18 PROCEDURE — 86901 BLOOD TYPING SEROLOGIC RH(D): CPT | Performed by: STUDENT IN AN ORGANIZED HEALTH CARE EDUCATION/TRAINING PROGRAM

## 2017-01-18 PROCEDURE — 83605 ASSAY OF LACTIC ACID: CPT | Performed by: OBSTETRICS & GYNECOLOGY

## 2017-01-18 PROCEDURE — 25000125 ZZHC RX 250: Performed by: REGISTERED NURSE

## 2017-01-18 PROCEDURE — 85027 COMPLETE CBC AUTOMATED: CPT | Performed by: STUDENT IN AN ORGANIZED HEALTH CARE EDUCATION/TRAINING PROGRAM

## 2017-01-18 PROCEDURE — 86923 COMPATIBILITY TEST ELECTRIC: CPT | Performed by: STUDENT IN AN ORGANIZED HEALTH CARE EDUCATION/TRAINING PROGRAM

## 2017-01-18 PROCEDURE — 36000057 ZZH SURGERY LEVEL 3 1ST 30 MIN - UMMC: Performed by: OBSTETRICS & GYNECOLOGY

## 2017-01-18 PROCEDURE — 74177 CT ABD & PELVIS W/CONTRAST: CPT

## 2017-01-18 PROCEDURE — 12000008 ZZH R&B INTERMEDIATE UMMC

## 2017-01-18 RX ORDER — AMOXICILLIN 250 MG
1-2 CAPSULE ORAL 2 TIMES DAILY
Status: DISCONTINUED | OUTPATIENT
Start: 2017-01-18 | End: 2017-01-20 | Stop reason: HOSPADM

## 2017-01-18 RX ORDER — SODIUM CHLORIDE, SODIUM LACTATE, POTASSIUM CHLORIDE, CALCIUM CHLORIDE 600; 310; 30; 20 MG/100ML; MG/100ML; MG/100ML; MG/100ML
INJECTION, SOLUTION INTRAVENOUS CONTINUOUS
Status: DISCONTINUED | OUTPATIENT
Start: 2017-01-18 | End: 2017-01-18 | Stop reason: HOSPADM

## 2017-01-18 RX ORDER — SODIUM CHLORIDE 9 MG/ML
INJECTION, SOLUTION INTRAVENOUS CONTINUOUS
Status: DISCONTINUED | OUTPATIENT
Start: 2017-01-18 | End: 2017-01-18

## 2017-01-18 RX ORDER — CEPHALEXIN 500 MG/1
500 CAPSULE ORAL EVERY 6 HOURS SCHEDULED
Status: DISCONTINUED | OUTPATIENT
Start: 2017-01-18 | End: 2017-01-20 | Stop reason: HOSPADM

## 2017-01-18 RX ORDER — HYDROMORPHONE HCL/0.9% NACL/PF 0.2MG/0.2
0.2 SYRINGE (ML) INTRAVENOUS 2 TIMES DAILY PRN
Status: DISCONTINUED | OUTPATIENT
Start: 2017-01-18 | End: 2017-01-19

## 2017-01-18 RX ORDER — IBUPROFEN 600 MG/1
600 TABLET, FILM COATED ORAL EVERY 6 HOURS PRN
Status: DISCONTINUED | OUTPATIENT
Start: 2017-01-18 | End: 2017-01-20 | Stop reason: HOSPADM

## 2017-01-18 RX ORDER — PROCHLORPERAZINE MALEATE 5 MG
5-10 TABLET ORAL EVERY 6 HOURS PRN
Status: DISCONTINUED | OUTPATIENT
Start: 2017-01-18 | End: 2017-01-20 | Stop reason: HOSPADM

## 2017-01-18 RX ORDER — PROPOFOL 10 MG/ML
INJECTION, EMULSION INTRAVENOUS CONTINUOUS PRN
Status: DISCONTINUED | OUTPATIENT
Start: 2017-01-18 | End: 2017-01-19

## 2017-01-18 RX ORDER — OXYCODONE HYDROCHLORIDE 5 MG/1
5-10 TABLET ORAL
Status: DISCONTINUED | OUTPATIENT
Start: 2017-01-18 | End: 2017-01-20

## 2017-01-18 RX ORDER — CEFAZOLIN SODIUM 1 G/3ML
INJECTION, POWDER, FOR SOLUTION INTRAMUSCULAR; INTRAVENOUS PRN
Status: DISCONTINUED | OUTPATIENT
Start: 2017-01-18 | End: 2017-01-19

## 2017-01-18 RX ORDER — MAGNESIUM HYDROXIDE 1200 MG/15ML
LIQUID ORAL PRN
Status: DISCONTINUED | OUTPATIENT
Start: 2017-01-18 | End: 2017-01-18 | Stop reason: HOSPADM

## 2017-01-18 RX ORDER — PROCHLORPERAZINE 25 MG
25 SUPPOSITORY, RECTAL RECTAL EVERY 12 HOURS PRN
Status: DISCONTINUED | OUTPATIENT
Start: 2017-01-18 | End: 2017-01-20 | Stop reason: HOSPADM

## 2017-01-18 RX ORDER — FERROUS GLUCONATE 324(38)MG
324 TABLET ORAL
Status: DISCONTINUED | OUTPATIENT
Start: 2017-01-19 | End: 2017-01-20 | Stop reason: HOSPADM

## 2017-01-18 RX ORDER — ONDANSETRON 4 MG/1
4 TABLET, ORALLY DISINTEGRATING ORAL EVERY 6 HOURS PRN
Status: DISCONTINUED | OUTPATIENT
Start: 2017-01-18 | End: 2017-01-20 | Stop reason: HOSPADM

## 2017-01-18 RX ORDER — NALOXONE HYDROCHLORIDE 0.4 MG/ML
.1-.4 INJECTION, SOLUTION INTRAMUSCULAR; INTRAVENOUS; SUBCUTANEOUS
Status: DISCONTINUED | OUTPATIENT
Start: 2017-01-18 | End: 2017-01-20 | Stop reason: HOSPADM

## 2017-01-18 RX ORDER — ONDANSETRON 2 MG/ML
4 INJECTION INTRAMUSCULAR; INTRAVENOUS EVERY 30 MIN PRN
Status: DISCONTINUED | OUTPATIENT
Start: 2017-01-18 | End: 2017-01-18 | Stop reason: HOSPADM

## 2017-01-18 RX ORDER — FENTANYL CITRATE 50 UG/ML
25-50 INJECTION, SOLUTION INTRAMUSCULAR; INTRAVENOUS
Status: DISCONTINUED | OUTPATIENT
Start: 2017-01-18 | End: 2017-01-18 | Stop reason: HOSPADM

## 2017-01-18 RX ORDER — ACETAMINOPHEN 325 MG/1
650 TABLET ORAL EVERY 4 HOURS PRN
Status: DISCONTINUED | OUTPATIENT
Start: 2017-01-18 | End: 2017-01-20 | Stop reason: HOSPADM

## 2017-01-18 RX ORDER — ONDANSETRON 4 MG/1
4 TABLET, ORALLY DISINTEGRATING ORAL EVERY 30 MIN PRN
Status: DISCONTINUED | OUTPATIENT
Start: 2017-01-18 | End: 2017-01-18 | Stop reason: HOSPADM

## 2017-01-18 RX ORDER — LIDOCAINE HYDROCHLORIDE 10 MG/ML
INJECTION, SOLUTION INFILTRATION; PERINEURAL PRN
Status: DISCONTINUED | OUTPATIENT
Start: 2017-01-18 | End: 2017-01-18 | Stop reason: HOSPADM

## 2017-01-18 RX ORDER — METOCLOPRAMIDE HYDROCHLORIDE 5 MG/ML
10 INJECTION INTRAMUSCULAR; INTRAVENOUS EVERY 6 HOURS PRN
Status: DISCONTINUED | OUTPATIENT
Start: 2017-01-18 | End: 2017-01-20 | Stop reason: HOSPADM

## 2017-01-18 RX ORDER — HYDROMORPHONE HYDROCHLORIDE 1 MG/ML
.3-.5 INJECTION, SOLUTION INTRAMUSCULAR; INTRAVENOUS; SUBCUTANEOUS EVERY 5 MIN PRN
Status: DISCONTINUED | OUTPATIENT
Start: 2017-01-18 | End: 2017-01-18 | Stop reason: HOSPADM

## 2017-01-18 RX ORDER — SODIUM CHLORIDE, SODIUM LACTATE, POTASSIUM CHLORIDE, CALCIUM CHLORIDE 600; 310; 30; 20 MG/100ML; MG/100ML; MG/100ML; MG/100ML
INJECTION, SOLUTION INTRAVENOUS CONTINUOUS PRN
Status: DISCONTINUED | OUTPATIENT
Start: 2017-01-18 | End: 2017-01-19

## 2017-01-18 RX ORDER — ONDANSETRON 2 MG/ML
4 INJECTION INTRAMUSCULAR; INTRAVENOUS EVERY 6 HOURS PRN
Status: DISCONTINUED | OUTPATIENT
Start: 2017-01-18 | End: 2017-01-20 | Stop reason: HOSPADM

## 2017-01-18 RX ORDER — KETOROLAC TROMETHAMINE 30 MG/ML
INJECTION, SOLUTION INTRAMUSCULAR; INTRAVENOUS PRN
Status: DISCONTINUED | OUTPATIENT
Start: 2017-01-18 | End: 2017-01-19

## 2017-01-18 RX ORDER — IOPAMIDOL 755 MG/ML
100 INJECTION, SOLUTION INTRAVASCULAR ONCE
Status: COMPLETED | OUTPATIENT
Start: 2017-01-18 | End: 2017-01-18

## 2017-01-18 RX ORDER — PROPOFOL 10 MG/ML
INJECTION, EMULSION INTRAVENOUS PRN
Status: DISCONTINUED | OUTPATIENT
Start: 2017-01-18 | End: 2017-01-19

## 2017-01-18 RX ORDER — FENTANYL CITRATE 50 UG/ML
INJECTION, SOLUTION INTRAMUSCULAR; INTRAVENOUS PRN
Status: DISCONTINUED | OUTPATIENT
Start: 2017-01-18 | End: 2017-01-19

## 2017-01-18 RX ORDER — LIDOCAINE HYDROCHLORIDE 20 MG/ML
INJECTION, SOLUTION INFILTRATION; PERINEURAL PRN
Status: DISCONTINUED | OUTPATIENT
Start: 2017-01-18 | End: 2017-01-19

## 2017-01-18 RX ORDER — METOCLOPRAMIDE 5 MG/1
10 TABLET ORAL EVERY 6 HOURS PRN
Status: DISCONTINUED | OUTPATIENT
Start: 2017-01-18 | End: 2017-01-20 | Stop reason: HOSPADM

## 2017-01-18 RX ADMIN — PROPOFOL 20 MG: 10 INJECTION, EMULSION INTRAVENOUS at 16:11

## 2017-01-18 RX ADMIN — CEPHALEXIN 500 MG: 500 CAPSULE ORAL at 18:38

## 2017-01-18 RX ADMIN — FENTANYL CITRATE 50 MCG: 50 INJECTION, SOLUTION INTRAMUSCULAR; INTRAVENOUS at 15:56

## 2017-01-18 RX ADMIN — CEFAZOLIN 2 G: 1 INJECTION, POWDER, FOR SOLUTION INTRAMUSCULAR; INTRAVENOUS at 16:00

## 2017-01-18 RX ADMIN — SENNOSIDES AND DOCUSATE SODIUM 1 TABLET: 8.6; 5 TABLET ORAL at 20:55

## 2017-01-18 RX ADMIN — SODIUM CHLORIDE, POTASSIUM CHLORIDE, SODIUM LACTATE AND CALCIUM CHLORIDE: 600; 310; 30; 20 INJECTION, SOLUTION INTRAVENOUS at 15:30

## 2017-01-18 RX ADMIN — PROPOFOL 20 MG: 10 INJECTION, EMULSION INTRAVENOUS at 16:05

## 2017-01-18 RX ADMIN — IBUPROFEN 600 MG: 600 TABLET ORAL at 18:38

## 2017-01-18 RX ADMIN — PROPOFOL 30 MG: 10 INJECTION, EMULSION INTRAVENOUS at 15:56

## 2017-01-18 RX ADMIN — OXYCODONE HYDROCHLORIDE 5 MG: 5 TABLET ORAL at 18:38

## 2017-01-18 RX ADMIN — LIDOCAINE HYDROCHLORIDE 40 MG: 20 INJECTION, SOLUTION INFILTRATION; PERINEURAL at 15:56

## 2017-01-18 RX ADMIN — ONDANSETRON 4 MG: 2 INJECTION INTRAMUSCULAR; INTRAVENOUS at 16:23

## 2017-01-18 RX ADMIN — OXYCODONE HYDROCHLORIDE 10 MG: 5 TABLET ORAL at 12:19

## 2017-01-18 RX ADMIN — MIDAZOLAM HYDROCHLORIDE 2 MG: 1 INJECTION, SOLUTION INTRAMUSCULAR; INTRAVENOUS at 15:45

## 2017-01-18 RX ADMIN — ACETAMINOPHEN 650 MG: 325 TABLET, FILM COATED ORAL at 18:38

## 2017-01-18 RX ADMIN — SODIUM CHLORIDE: 9 INJECTION, SOLUTION INTRAVENOUS at 12:20

## 2017-01-18 RX ADMIN — IBUPROFEN 600 MG: 600 TABLET ORAL at 12:19

## 2017-01-18 RX ADMIN — SODIUM CHLORIDE 60 ML: 9 INJECTION, SOLUTION INTRAVENOUS at 12:33

## 2017-01-18 RX ADMIN — FENTANYL CITRATE 25 MCG: 50 INJECTION, SOLUTION INTRAMUSCULAR; INTRAVENOUS at 16:11

## 2017-01-18 RX ADMIN — PROPOFOL 100 MCG/KG/MIN: 10 INJECTION, EMULSION INTRAVENOUS at 15:56

## 2017-01-18 RX ADMIN — FENTANYL CITRATE 25 MCG: 50 INJECTION, SOLUTION INTRAMUSCULAR; INTRAVENOUS at 16:05

## 2017-01-18 RX ADMIN — IOPAMIDOL 79 ML: 755 INJECTION, SOLUTION INTRAVENOUS at 12:34

## 2017-01-18 RX ADMIN — KETOROLAC TROMETHAMINE 30 MG: 30 INJECTION, SOLUTION INTRAMUSCULAR at 16:23

## 2017-01-18 ASSESSMENT — PAIN SCALES - GENERAL: PAINLEVEL: SEVERE PAIN (7)

## 2017-01-18 NOTE — OP NOTE
[facility] Operative Note    Date of service: 2017    Name: Claudine Wells   MRN: [id number]   : 1995     Pre-operative diagnosis:  1. Post-operative Seroma  2. POD# 7 s/p PLTCS    Post-operative diagnosis:  Wound abscess     Surgeon:  Taylor Hale MD    Assistant:  Lorena Maravilla MD, PGY 1  Saulo Garcia MD PGY4  Mariano Brettdayday MS3    Procedure:  1. EUA, wound exploration, I & D, washout, wound packing    Anesthesia: MAC  Fluids: 150mL  EBL: 10mL    Complications: None  Specimen: Aerobic and anaerobic culture of abscess material    Indication: 23yo  POD#7 s/p PLTCS, presents with likely post-operative seroma based on physical exam findings and CT scan results. Discussed with patient recommendation for EUA and wound exploration to determine the extent of her wound separation and evaluate for fascial involvement. She is in agreement with the plan, signed consent obtained.     Findings:  1cm skin separation at the right corner of Pfannenstiel incision. 100cc purulent fluid drained from the incision. Upon draining of purulent fluid, remainder of wound bed appeared healthy with vascular granulation tissue. Thin layer of fibrinous exudate covering the wound bed which was easily debrided bluntly.    Procedure:  Patient was taken to the OR where MAC anesthesia was undertaken.  Once patient was comfortable she was positioned in the dorsal lithotomy position.  She was then prepped and draped in the normal sterile fashion.  A presurgical pause was undertaken and the proper patient and procedure were identified.  2cc 1% lidocaine plain was injected into the skin. A 10 blade scalpel was used to extend the open aspect of the incision, which on blunt dissection extended to 11cm total lenght. 100cc purulent material was expressed and a sample was sent for culture. The wound defect extended to the fascia, the fascia was intact. The wound defect was washed out with sterile saline. Fibrinous exudate was debrided  bluntly, and Kerlix was used to pack the defect. The patient tolerated the procedure well and was taken to the PACU for recovery in stable condition. Instrument, needle and sponge counts correct x2.  Dr. Hale was scrubbed and present for the entire procedure.    Lorena Maravilla MD   Resident Physician, PGY1  Obstetrics, Gynecology, and Women's Health    Staff:  I was scrubbed and present for entire case and agree w/ above note.    Taylor Hale MD

## 2017-01-18 NOTE — PLAN OF CARE
Problem: Infection, Risk/Actual (Adult)  Goal: Identify Related Risk Factors and Signs and Symptoms  Related risk factors and signs and symptoms are identified upon initiation of Human Response Clinical Practice Guideline (CPG)  Outcome: No Change  Pt admitted from the OB clinic around 1100.  Pt had a  7 days ago.  Pt is very anxious and nervous regarding condition.  Alert and OX4,  VSS, CT done.  NPO for surgery later this afternoon, time to be determined.  Mother and baby are now here and pt informed about mom having to stay with baby at all times.  Pt is wishing to breast feed at this time, but was informed we could get a pump if needed.  Up with SBA, c/o severe abd pain with movement, medicated with Oxycodone with some relief.  Abd incision has large amount of brownish drainage when palpated, small pin point opening.  Will cont to monitor and get ready for a clean out this afternoon, pending CT results.

## 2017-01-18 NOTE — Clinical Note
"1/18/2017       RE: Claudine Wells  [address]   [place] [place] [address]     Dear Colleague,    Thank you for referring your patient, Claudine Wells, to the [facility] at [facility]. Please see a copy of my visit note below.    [facility] Visit    CC: wound check    S: 22 year old  POD#7 s/p PLTCS under GETA (non-working epidural) here for wound check. Incision started draining overnight Monday and continues. Reports old looking blood. Pain is also worsening over past day, still taking regular ibuprofen and oxycodone. Denies fever or chills. Breastfeeding without issues. Reports worsening swelling since discharge, mild headache today.     O: /72 mmHg  Pulse 105  Temp(Src) 98.4  F (36.9  C) (Oral)  Ht 1.676 m (5' 6\")  Wt 73.8 kg (162 lb 11.2 oz)  BMI 26.27 kg/m2  LMP 03/29/2016 (Approximate)  General: Uncomfortable appearing, rode in wheelchair to clinic  Abdomen: Soft, Incision with small defect on right mid aspect pfannensteil. Copious blood tinged drainage filling 3 ABD pads. Attempted to probe with qtip, was able to get in defect, however patient very uncomfortable and not tolerating further exam. No erythema around incision, some induration.   Extremities: 3+ BLE edema      A:22 year old with wound seroma vs infection    P: Recommend EUA, wound exploration under anesthesia. Discussed need to open incision with patient and mother including subsequent healing by secondary intention requiring packing. Will see if candidate for wound vac.   Due to amount of drainage will get CT to evaluate for possible fascial dehiscence  Patient ate oatmeal at 8am, will add on to OR schedule this afternoon  Admit to 10A for imaging while waiting, discussed likely overnight stay depending on findings  No abx at this point, will re-address following procedure findings  Consider dose lasix for significant edema, no evidence of " preeclampsia      Reyna Justin MD FACOG

## 2017-01-18 NOTE — ANESTHESIA PREPROCEDURE EVALUATION
Anesthesia Evaluation     . Pt has had prior anesthetic. Type: Regional and General      ROS/MED HX    ENT/Pulmonary:  - neg pulmonary ROS     Neurologic:  - neg neurologic ROS     Cardiovascular:  - neg cardiovascular ROS       METS/Exercise Tolerance:  >4 METS   Hematologic:  - neg hematologic  ROS       Musculoskeletal:   (+) , , other musculoskeletal- abdominal wall/ wound fluid collection      GI/Hepatic:  - neg GI/hepatic ROS       Renal/Genitourinary:  - ROS Renal section negative       Endo:  - neg endo ROS       Psychiatric:  - neg psychiatric ROS       Infectious Disease:  - neg infectious disease ROS       Malignancy:      - no malignancy   Other:    - neg other ROS           Physical Exam  Normal systems: cardiovascular, pulmonary and dental    Airway   Mallampati: II  TM distance: >3 FB  Neck ROM: full    Dental     Cardiovascular   Rhythm and rate: regular and normal      Pulmonary    breath sounds clear to auscultation                    Anesthesia Plan      History & Physical Review  History and physical reviewed and following examination; no interval change.    ASA Status:  1 .    NPO Status:  > 6 hours    Plan for MAC with Propofol and Intravenous induction. Maintenance will be Other.  Reason for MAC:  Deep or markedly invasive procedure (G8)  PONV prophylaxis:  Ondansetron (or other 5HT-3)       Postoperative Care  Postoperative pain management:  IV analgesics and Oral pain medications.      Consents  Anesthetic plan, risks, benefits and alternatives discussed with:  Patient..                          .

## 2017-01-18 NOTE — NURSING NOTE
Chief Complaint   Patient presents with     Wound Check     Pt c/o incision pain with walking and sitting. Pt states dark red drainage.

## 2017-01-18 NOTE — OR NURSING
Report taken from Jill, 10th floor RN. Consent signed and in chart. Left hand PIV patent. Preop checklist completed by Jill. Report given to Sue Schoeneker, RN.

## 2017-01-18 NOTE — PROGRESS NOTES
"[facility] Visit    CC: wound check    S: 22 year old  POD#7 s/p PLTCS under GETA (non-working epidural) here for wound check. Incision started draining overnight Monday and continues. Reports old looking blood. Pain is also worsening over past day, still taking regular ibuprofen and oxycodone. Denies fever or chills. Breastfeeding without issues. Reports worsening swelling since discharge, mild headache today.     O: /72 mmHg  Pulse 105  Temp(Src) 98.4  F (36.9  C) (Oral)  Ht 1.676 m (5' 6\")  Wt 73.8 kg (162 lb 11.2 oz)  BMI 26.27 kg/m2  LMP 03/29/2016 (Approximate)  General: Uncomfortable appearing, rode in wheelchair to clinic  Abdomen: Soft, Incision with small defect on right mid aspect pfannensteil. Copious blood tinged drainage filling 3 ABD pads. Attempted to probe with qtip, was able to get in defect, however patient very uncomfortable and not tolerating further exam. No erythema around incision, some induration.   Extremities: 3+ BLE edema      A:22 year old with wound seroma vs infection    P: Recommend EUA, wound exploration under anesthesia. Discussed need to open incision with patient and mother including subsequent healing by secondary intention requiring packing. Will see if candidate for wound vac.   Due to amount of drainage will get CT to evaluate for possible fascial dehiscence  Patient ate oatmeal at 8am, will add on to OR schedule this afternoon  Admit to 10A for imaging while waiting, discussed likely overnight stay depending on findings  No abx at this point, will re-address following procedure findings  Consider dose lasix for significant edema, no evidence of preeclampsia      Reyna Justin MD FACOG  "

## 2017-01-18 NOTE — PROGRESS NOTES
Interim progress note    Incision and drainage revealed purulent material, likely developing wound abscess. Therefore will start patient on PO Keflex 500mg Q6H.   Kerlix dressing will need to be changed BID. Plan to teach patient regarding wound cares in the morning and discharge at that point.      Lorena Maravilla MD   Resident Physician, PGY1  Obstetrics, Gynecology, and Women's Health

## 2017-01-18 NOTE — H&P
[facility] History and Physical    Claudine Wells MRN# [id number]   Age: 22 year old YOB: 1995     Date of Admission:  2017    Patient Summary:  Claudine Wells is a 22 year old , now POD#7 s/p PLTCS for arrest of dilation who was admitted from clinic for drainage from the incision site, concerning for seroma vs abscess.     HISTORY OF PRESENT ILLNESS: Per patient, she started having yellow-red drainage from her incision two days after discharge. The drainage has not worsened but has not improved. She reports chills last night, but no fever when she checked her temperature. She complains of incisional pain since discharge, which is somewhat improved with her oral pain regimen. She is able to carry her baby, but has not been walking and is not performing activities of daily living due to incisional pain. She also endorses her LE edema has worsened since surgery. She has no lochia, she is breastfeeding. She has otherwise been eating well, voiding spontaneously, having soft bowel movements.     ROS: None except as described above    PAST MEDICAL HISTORY  Past Medical History   Diagnosis Date     ADHD (attention deficit hyperactivity disorder)      Anemia      Migraines      Chlamydia      Gonorrhea        PAST SURGICAL HISTORY   Past Surgical History   Procedure Laterality Date      section N/A 2017     Procedure:  SECTION;  Surgeon: Estefany Naylor MD;  Location:  L+D       MEDICATIONS     Current Facility-Administered Medications on File Prior to Encounter:  lidocaine-EPINEPHrine 1.5 %-[id number] injection   lidocaine 1 % injection   bupivacaine (MARCAINE) 0.125 % injection (diluted from stock concentration by MD or CRNA)     Current Outpatient Prescriptions on File Prior to Encounter:  oxyCODONE (ROXICODONE) 5 MG IR tablet Take 1-2 tablets (5-10 mg) by mouth every 3 hours as needed for moderate to severe pain   acetaminophen (TYLENOL) 325 MG tablet Take 2  tablets (650 mg) by mouth every 4 hours as needed for other (surgical pain)   ibuprofen (ADVIL/MOTRIN) 400 MG tablet Take 1-2 tablets (400-800 mg) by mouth every 6 hours as needed for other (cramping)   senna-docusate (SENOKOT-S;PERICOLACE) 8.6-50 MG per tablet Take 1-2 tablets by mouth 2 times daily   ferrous gluconate (FERGON) 324 (38 FE) MG tablet Take 1 tablet (324 mg) by mouth daily (with breakfast)   Prenatal Vit-Fe Fumarate-FA (GNP PRENATAL VITAMINS) 28-0.8 MG TABS Take 1 tablet by mouth daily   Azelaic Acid 15 % gel Massage thin film gently into afected areas of the face twice a day morning and evening.   cholecalciferol (VITAMIN D3) 5000 UNITS CAPS capsule Take 1 capsule (5,000 Units) by mouth daily Take one capsule daily.       Allergies:    No Known Allergies    SOCIAL HISTORY   Social History     Social History     Marital Status: Single     Spouse Name: N/A     Number of Children: N/A     Years of Education: N/A     Occupational History     homemaker      Social History Main Topics     Smoking status: Former Smoker -- 1.00 packs/day     Types: Cigarettes     Smokeless tobacco: Never Used     Alcohol Use: No     Drug Use: No     Sexual Activity:     Partners: Male     Birth Control/ Protection: None      PHYSICAL EXAM   Filed Vitals:    01/18/17 1211   BP: 136/72   Temp: 97.6  F (36.4  C)   TempSrc: Oral   Resp: 18   SpO2: 98%      Gen: In no distress  CV: RRR, no murmurs/rubs/gallops; peripheral pulses 2+ bilaterally  Pulm: CTAB, no increased work of breathing, no wheezing/rhonchi/crackles  Abd: 0.5cm skin separation on the right aspect of Pfannenstiel incision with serosanguinous discharge, no erythema. Mild generalized tenderness to palpation, soft, non-distended.  Extremities: +2 edema up to calves bilaterally, non-tender, no erythema    Imaging  CT Abdomen-Pelvis with contrast 1/18/2017 IMPRESSION: Small amount of fluid in the subcutaneous tissues that  does not appear well encapsulated or  peripherally enhancing as would be expected for a well-formed abscess. Imaging findings are compatible with a postoperative seroma.    ASSESSMENT AND PLAN   21yo  POD#7 s/p PLTCS, presents with likely post-operative seroma based on physical exam findings and CT scan results. Discussed with patient recommendation for EUA and wound exploration to determine the extent of her wound separation and evaluate for fascial involvement. She is in agreement with the plan, signed consent obtained.     Skin separation   -Likely wound seroma base on physical exam findings of serosanguinous discharge, no surrounding erythema, and afebrile status. CT scan consistent with post-operative seroma.  -To OR for EUA, wound exploration, and I & D  -CBC and Type and screen pending    Pain: Continue home regimen of Tylenol, Ibuprofen, and Roxicodone PRN  Edema: Consider Lasix for symptomatic relief  Bowel regimen: Continue Senna  PPx: SCDs    Patient seen and discussed with Dr. Hale.    Lorena Maravilla MD   Resident Physician, PGY1  Obstetrics, Gynecology, and Women's Health    [facility] staff:  Appreciate note by Dr. Maravilla.  I have seen and examined the patient without the resident. I have reviewed, edited, and agree with the note.    My findings are: purulent d/c from right most aspect of wound. Mild to moderate erythema surrounding.     Taylor Hale MD, FACOG  2017  10:44 AM

## 2017-01-18 NOTE — IP AVS SNAPSHOT
MRN:[id number]                      After Visit Summary   1/18/2017    Claudine Wells    MRN: [id number]           Thank you!     Thank you for choosing [facility] for your care. Our goal is always to provide you with excellent care. Hearing back from our patients is one way we can continue to improve our services. Please take a few minutes to complete the written survey that you may receive in the mail after you visit with us. Thank you!        Patient Information     Date Of Birth          1995        About your hospital stay     You were admitted on:  January 18, 2017 You last received care in the:  [facility]    You were discharged on:  January 20, 2017        Reason for your hospital stay       Wound infection                  Who to Call     For medical emergencies, please call 911.  For non-urgent questions about your medical care, please call your primary care provider or clinic, [phone]  For questions related to your surgery, please call your surgery clinic        Attending Provider     Provider    Taylor Hale MD       Primary Care Provider Office Phone # Fax #    [facility] [phone]       [address] [place] [address]         When to contact your care team       Call if you experience any of the following: fever >100.4F, worsening redness or drainage from the incision, vaginal bleeding greater than 1 pad/hour, pain not relieved by pain medications, severe nausea and vomiting or foul-smelling vaginal discharge                  After Care Instructions     Activity       Your activity upon discharge: activity as tolerated; still avoid any lifting over 15 pounds for 6 weeks, or anything in the vagina for 6 weeks            Diet       Follow this diet upon discharge: Regular            Wound care and dressings       Instructions to care for your wound at home: pack surgical incision wound with wet to dry Kerlix dressing, cover  with ABD and secure with paper tape. Change daily.                  Follow-up Appointments     Adult [facility] Follow-up and recommended labs and tests       You are scheduled for an appointment in the Ob/Gyn clinic for wound check within a week of discharge as follows:  Thursday January 26 at 2:30PM with Dr. Soares. See details below and call clinic with any questions or concerns                  Your next 10 appointments already scheduled     Jan 26, 2017  2:30 PM   Return Visit with Jerri Soares MD   [facility] ([facility])    [facility] [address]  [address] [facility] [id number] [phone]            Feb 16, 2017  2:15 PM   (Arrive by 2:00 PM)   Return Visit with JOSUE [facility] ([facility])    [address] [facility]  [address] [facility] [address] [phone]              Additional Services     Home care nursing referral       [facility]  Phone  [phone]  Fax  [phone]    RN skilled nursing visit- to begin one day after hospital discharge. RN to assess vital signs and weight, respiratory and cardiac status, pain level and activity tolerance, incision for signs/symptoms of infection, hydration, nutrition and bowel status and home safety.  RN to teach medication management and wound care dressing changes.  RN to provide wound care supervision.                  Further instructions from your care team           ___________Your baby has an appointment with the __Lactation Consultant at [facility] ([address] [place] [address]) on February 1, 2017 at 2pm.    [phone] Clinic Phone number.   _________________________________________________________  Local Resources for Breastfeeding Support in [place]:   La Leche League of the [place] and the Hospitals in [place] http://www.nichole.org/ or call 851-173-2815   Women,  "Infants and Children (WIC) Program http://www.health.[facility].[place]/divs/fh/wic/bf/index.html or call 1-861.656.5178   Infant Risk Center (up-to-date, evidence-based information on the use of medications during pregnancy and breastfeeding) http://www.infantNosco HQ.Cycle/ or call 1-857.384.4657  _______________________________________________________________________________    Pending Results     Date and Time Order Name Status Description    2017 1600 Anaerobic bacterial culture Preliminary             Statement of Approval     Ordered          17 6198  I have reviewed and agree with all the recommendations and orders detailed in this document.   EFFECTIVE NOW     Approved and electronically signed by:  Lorena Maravilla MD             Admission Information        Provider Department Dept Phone    2017 Taylor Hale MD Ur 10a [phone]      Your Vitals Were     Blood Pressure Pulse Temperature Respirations Pulse Oximetry       109/51 mmHg 95 98.4  F (36.9  C) (Oral) 16 98%       MyChart Information     [employer] lets you send messages to your doctor, view your test results, renew your prescriptions, schedule appointments and more. To sign up, go to [email or web address] . Click on \"Log in\" on the left side of the screen, which will take you to the Welcome page. Then click on \"Sign up Now\" on the right side of the page.     You will be asked to enter the access code listed below, as well as some personal information. Please follow the directions to create your username and password.     Your access code is: [email or web address]  Expires: 2017  9:30 AM     Your access code will  in 90 days. If you need help or a new code, please call your [facility] or [phone].        Care EveryWhere ID     This is your Care EveryWhere ID. This could be used by other organizations to access your [facility] records  [id number]           Review of your medicines      START taking        Dose / " Directions    cephALEXin 500 MG capsule   Commonly known as:  KEFLEX   Indication:  Abscess   Used for:  Wound infection        Dose:  500 mg   Take 1 capsule (500 mg) by mouth every 6 hours for 8 days   Quantity:  32 capsule   Refills:  0       oxyCODONE-acetaminophen 5-325 MG per tablet   Commonly known as:  PERCOCET   Used for:  Wound infection        Dose:  1-2 tablet   Take 1-2 tablets by mouth every 4 hours as needed for pain   Quantity:  40 tablet   Refills:  0         CONTINUE these medicines which may have CHANGED, or have new prescriptions. If we are uncertain of the size of tablets/capsules you have at home, strength may be listed as something that might have changed.        Dose / Directions    oxyCODONE 5 MG IR tablet   Commonly known as:  ROXICODONE   This may have changed:  how much to take   Used for:  S/P  section        Dose:  5 mg   Take 1 tablet (5 mg) by mouth every 3 hours as needed for moderate to severe pain   Quantity:  30 tablet   Refills:  0         CONTINUE these medicines which have NOT CHANGED        Dose / Directions    acetaminophen 325 MG tablet   Commonly known as:  TYLENOL   Used for:  S/P  section        Dose:  650 mg   Take 2 tablets (650 mg) by mouth every 4 hours as needed for other (surgical pain)   Quantity:  60 tablet   Refills:  3       Azelaic Acid 15 % gel   Used for:  Acne vulgaris        Massage thin film gently into afected areas of the face twice a day morning and evening.   Quantity:  50 g   Refills:  6       cholecalciferol 5000 UNITS Caps capsule   Commonly known as:  vitamin D3   Used for:  Vitamin D deficiency        Dose:  5000 Units   Take 1 capsule (5,000 Units) by mouth daily Take one capsule daily.   Quantity:  90 capsule   Refills:  3       ferrous gluconate 324 (38 FE) MG tablet   Commonly known as:  FERGON   Used for:  S/P  section        Dose:  324 mg   Take 1 tablet (324 mg) by mouth daily (with breakfast)   Quantity:  100 tablet    Refills:  3       GNP PRENATAL VITAMINS 28-0.8 MG Tabs   Used for:  Vitamin D deficiency, HRP (high risk pregnancy), second trimester, GBS (group B streptococcus) UTI complicating pregnancy, second trimester, HPV in female        Dose:  1 tablet   Take 1 tablet by mouth daily   Quantity:  100 tablet   Refills:  3       ibuprofen 400 MG tablet   Commonly known as:  ADVIL/MOTRIN   Used for:  S/P  section        Dose:  400-800 mg   Take 1-2 tablets (400-800 mg) by mouth every 6 hours as needed for other (cramping)   Quantity:  60 tablet   Refills:  0       senna-docusate 8.6-50 MG per tablet   Commonly known as:  SENOKOT-S;PERICOLACE   Used for:  S/P  section        Dose:  1-2 tablet   Take 1-2 tablets by mouth 2 times daily   Quantity:  60 tablet   Refills:  0            Where to get your medicines      These medications were sent to [facility] [address]  [address] [facility] [address], [facility] [address]     Phone:  [phone]    - cephALEXin 500 MG capsule  - ibuprofen 400 MG tablet  - senna-docusate 8.6-50 MG per tablet      Some of these will need a paper prescription and others can be bought over the counter. Ask your nurse if you have questions.     Bring a paper prescription for each of these medications    - oxyCODONE 5 MG IR tablet  - oxyCODONE-acetaminophen 5-325 MG per tablet             Protect others around you: Learn how to safely use, store and throw away your medicines at www.disposemymeds.org.             Medication List: This is a list of all your medications and when to take them. Check marks below indicate your daily home schedule. Keep this list as a reference.      Medications           Morning Afternoon Evening Bedtime As Needed    acetaminophen 325 MG tablet   Commonly known as:  TYLENOL   Take 2 tablets (650 mg) by mouth every 4 hours as needed for other (surgical pain)   Last time this was given:  650 mg on 2017  4:16 PM                                 Azelaic Acid 15 % gel   Massage thin film gently into afected areas of the face twice a day morning and evening.                                cephALEXin 500 MG capsule   Commonly known as:  KEFLEX   Take 1 capsule (500 mg) by mouth every 6 hours for 8 days   Last time this was given:  500 mg on 1/20/2017 12:17 PM                                cholecalciferol 5000 UNITS Caps capsule   Commonly known as:  vitamin D3   Take 1 capsule (5,000 Units) by mouth daily Take one capsule daily.                                ferrous gluconate 324 (38 FE) MG tablet   Commonly known as:  FERGON   Take 1 tablet (324 mg) by mouth daily (with breakfast)   Last time this was given:  324 mg on 1/20/2017  8:02 AM                                GNP PRENATAL VITAMINS 28-0.8 MG Tabs   Take 1 tablet by mouth daily                                ibuprofen 400 MG tablet   Commonly known as:  ADVIL/MOTRIN   Take 1-2 tablets (400-800 mg) by mouth every 6 hours as needed for other (cramping)   Last time this was given:  600 mg on 1/20/2017 10:55 AM                                oxyCODONE 5 MG IR tablet   Commonly known as:  ROXICODONE   Take 1 tablet (5 mg) by mouth every 3 hours as needed for moderate to severe pain   Last time this was given:  5 mg on 1/20/2017  4:16 PM                                oxyCODONE-acetaminophen 5-325 MG per tablet   Commonly known as:  PERCOCET   Take 1-2 tablets by mouth every 4 hours as needed for pain                                senna-docusate 8.6-50 MG per tablet   Commonly known as:  SENOKOT-S;PERICOLACE   Take 1-2 tablets by mouth 2 times daily   Last time this was given:  2 tablets on 1/20/2017  8:02 AM

## 2017-01-18 NOTE — LETTER
Transition Communication Hand-off for Care Transitions to Next Level of Care Provider    Name: Claudine Wells  MRN #: [id number]  Primary Care Provider: [facility]     Primary Clinic: [address]  [facility] [address]     Reason for Hospitalization:  Wound infection  Wound infection  Wound Infection  Admit Date/Time: 1/18/2017 11:02 AM  Discharge Date: 1/19/17         Reason for Communication Hand-off Referral: post-hospital communication    Discharge Plan: Home with family support and home care        Referrals     Future Labs/Procedures    Home care nursing referral     Comments:    [facility]  Phone  [phone]  Fax  [phone]    RN skilled nursing visit- to begin on January 20, 2017. RN to assess vital signs and weight, respiratory and cardiac status, pain level and activity tolerance, incision for signs/symptoms of infection, hydration, nutrition and bowel status and home safety.  RN to teach medication management and wound care dressing changes.  RN to provide wound care supervision.

## 2017-01-18 NOTE — IP AVS SNAPSHOT
[facility]    [address]    [facility] [place] [place] [address]    Phone:  [phone]                                       After Visit Summary   1/18/2017    Claudine Wells    MRN: [id number]           After Visit Summary Signature Page     I have received my discharge instructions, and my questions have been answered. I have discussed any challenges I see with this plan with the nurse or doctor.    ..........................................................................................................................................  Patient/Patient Representative Signature      ..........................................................................................................................................  Patient Representative Print Name and Relationship to Patient    ..................................................               ................................................  Date                                            Time    ..........................................................................................................................................  Reviewed by Signature/Title    ...................................................              ..............................................  Date                                                            Time

## 2017-01-18 NOTE — UTILIZATION REVIEW
"[facility]  Admission Status; Secondary Review Determination     Admission Date: 1/18/2017 11:02 AM       Under the authority of the Utilization Management Committee, the utilization review process indicated a secondary review on the above patient.  The review outcome is based on review of the medical records, discussions with staff, and applying clinical experience noted on the date of the review.        (X)      Inpatient Status Appropriate (PRELIMINARY) - This patient's medical care is consistent with medical management for inpatient care and reasonable inpatient medical practice.      () Observation Status Appropriate - This patient does not meet hospital inpatient criteria and is placed in observation status. If this patient's primary payer is Medicare and was admitted as an inpatient, Condition Code 44 should be used and patient status changed to \"observation\".   () Admission Status NOT Appropriate - This patient's medical care is not consistent with medical management for Inpatient or Observation Status.        () Outpatient Procedure Status Appropriate - Procedure not on Medicare Inpatient list and no complications at the time of this review       RATIONALE FOR DETERMINATION      Brief clinical presentation, information copied from the chart, abbreviated and edited for relevant content:     Unsure based on chart review what appropriate status should be. I have paged the attending.     Patient initially appeared to be stable, but after labs returned her WBC is quite elevated and her hemoglobin is very low around 7, and will likely need a transfusion in addition to her surgery for EUA, wound exploration, and I&D. These are outpatient procedures but she is presenting acutely after a recent admission for delivery by C/S due to arrest of dilation. She has had wound drainage and pain since discharge from the hospital. Likely at the time of surgery will get cultures from the wound, which will take 2 days to " return, and likely now that the CBC results are back, blood cultures and additional  blood work will be ordered.She was afebrile so gave the impression this was not serious, but with more information since admission, I think inpatient may be  appropriate. I will await a call back from the attending, but likely she is in surgery right now and this may need to be followed up on by tomorrow's PA if I don't hear back.     If patient does well and is discharged tomorrow, then observation would be a more appropriate admit status. At the time of this note, I have not heard back from the attending MD.      In summary, the severity of illness, intensity of service provided, expected length of stay and risk for adverse outcome make the care complex, high risk and appropriate for hospital admission.        The information on this document is developed by the utilization review team in order for the business office to ensure compliance.  This only denotes the appropriateness of proper admission status and does not reflect the quality of care rendered.         The definitions of Inpatient Status and Observation Status used in making the determination above are those provided in the CMS Coverage Manual, Chapter 1 and Chapter 6, section 70.4.      Sincerely,      Romi Cardoza MD   Utilization Review/ Case Management  [facility].

## 2017-01-18 NOTE — ANESTHESIA POSTPROCEDURE EVALUATION
Patient: Claudine Wells    EXAM UNDER ANESTHESIA ABDOMEN (N/A Abdomen)  COMBINED INCISION AND DRAINAGE ABDOMEN WASHOUT (N/A Trunk)  Additional InformationProcedure(s):  I & D Abdominal wound - Wound Class: IV-Dirty or Infected   - Wound Class: IV-Dirty or Infected    Diagnosis:Wound Infection  Diagnosis Additional Information: No value filed.    Anesthesia Type:  MAC    Note:  Anesthesia Post Evaluation    Patient location during evaluation: Bedside  Patient participation: Able to fully participate in evaluation  Level of consciousness: awake and alert  Pain management: adequate  Airway patency: patent  Cardiovascular status: acceptable  Respiratory status: acceptable  Hydration status: acceptable  PONV: none     Anesthetic complications: None          Last vitals:  Filed Vitals:    01/18/17 1211   BP: 136/72   Temp: 36.4  C (97.6  F)   Resp: 18   SpO2: 98%       Electronically Signed By: Lubna Greenberg MD  January 18, 2017  4:59 PM

## 2017-01-19 LAB
ERYTHROCYTE [DISTWIDTH] IN BLOOD BY AUTOMATED COUNT: 13.7 % (ref 10–15)
HCT VFR BLD AUTO: 21.9 % (ref 35–47)
HGB BLD-MCNC: 6.9 G/DL (ref 11.7–15.7)
MCH RBC QN AUTO: 27.4 PG (ref 26.5–33)
MCHC RBC AUTO-ENTMCNC: 31.5 G/DL (ref 31.5–36.5)
MCV RBC AUTO: 87 FL (ref 78–100)
PLATELET # BLD AUTO: 280 10E9/L (ref 150–450)
RBC # BLD AUTO: 2.52 10E12/L (ref 3.8–5.2)
WBC # BLD AUTO: 13.8 10E9/L (ref 4–11)

## 2017-01-19 PROCEDURE — 25000132 ZZH RX MED GY IP 250 OP 250 PS 637: Performed by: STUDENT IN AN ORGANIZED HEALTH CARE EDUCATION/TRAINING PROGRAM

## 2017-01-19 PROCEDURE — 85027 COMPLETE CBC AUTOMATED: CPT | Performed by: STUDENT IN AN ORGANIZED HEALTH CARE EDUCATION/TRAINING PROGRAM

## 2017-01-19 PROCEDURE — 12000008 ZZH R&B INTERMEDIATE UMMC

## 2017-01-19 PROCEDURE — 36415 COLL VENOUS BLD VENIPUNCTURE: CPT | Performed by: STUDENT IN AN ORGANIZED HEALTH CARE EDUCATION/TRAINING PROGRAM

## 2017-01-19 PROCEDURE — 25000128 H RX IP 250 OP 636: Performed by: OBSTETRICS & GYNECOLOGY

## 2017-01-19 RX ORDER — AMOXICILLIN 250 MG
1-2 CAPSULE ORAL 2 TIMES DAILY
Qty: 60 TABLET | Refills: 0 | Status: SHIPPED | OUTPATIENT
Start: 2017-01-19

## 2017-01-19 RX ORDER — OXYCODONE AND ACETAMINOPHEN 5; 325 MG/1; MG/1
1-2 TABLET ORAL EVERY 4 HOURS PRN
Qty: 40 TABLET | Refills: 0 | Status: SHIPPED | OUTPATIENT
Start: 2017-01-19 | End: 2017-02-13

## 2017-01-19 RX ORDER — CEPHALEXIN 500 MG/1
500 CAPSULE ORAL EVERY 6 HOURS
Qty: 32 CAPSULE | Refills: 0 | Status: SHIPPED | OUTPATIENT
Start: 2017-01-19 | End: 2017-01-27

## 2017-01-19 RX ORDER — IBUPROFEN 400 MG/1
400-800 TABLET, FILM COATED ORAL EVERY 6 HOURS PRN
Qty: 60 TABLET | Refills: 0 | Status: SHIPPED | OUTPATIENT
Start: 2017-01-19

## 2017-01-19 RX ADMIN — ACETAMINOPHEN 650 MG: 325 TABLET, FILM COATED ORAL at 00:03

## 2017-01-19 RX ADMIN — SENNOSIDES AND DOCUSATE SODIUM 2 TABLET: 8.6; 5 TABLET ORAL at 09:23

## 2017-01-19 RX ADMIN — ACETAMINOPHEN 650 MG: 325 TABLET, FILM COATED ORAL at 04:54

## 2017-01-19 RX ADMIN — IBUPROFEN 600 MG: 600 TABLET ORAL at 13:59

## 2017-01-19 RX ADMIN — IBUPROFEN 600 MG: 600 TABLET ORAL at 20:06

## 2017-01-19 RX ADMIN — OXYCODONE HYDROCHLORIDE 10 MG: 5 TABLET ORAL at 17:18

## 2017-01-19 RX ADMIN — CEPHALEXIN 500 MG: 500 CAPSULE ORAL at 00:02

## 2017-01-19 RX ADMIN — SENNOSIDES AND DOCUSATE SODIUM 2 TABLET: 8.6; 5 TABLET ORAL at 20:10

## 2017-01-19 RX ADMIN — CEPHALEXIN 500 MG: 500 CAPSULE ORAL at 12:04

## 2017-01-19 RX ADMIN — OXYCODONE HYDROCHLORIDE 5 MG: 5 TABLET ORAL at 09:50

## 2017-01-19 RX ADMIN — OXYCODONE HYDROCHLORIDE 5 MG: 5 TABLET ORAL at 04:54

## 2017-01-19 RX ADMIN — FERROUS GLUCONATE 324 MG: 324 TABLET ORAL at 09:24

## 2017-01-19 RX ADMIN — ACETAMINOPHEN 650 MG: 325 TABLET, FILM COATED ORAL at 16:15

## 2017-01-19 RX ADMIN — OXYCODONE HYDROCHLORIDE 5 MG: 5 TABLET ORAL at 13:47

## 2017-01-19 RX ADMIN — OXYCODONE HYDROCHLORIDE 10 MG: 5 TABLET ORAL at 20:05

## 2017-01-19 RX ADMIN — CEPHALEXIN 500 MG: 500 CAPSULE ORAL at 06:17

## 2017-01-19 RX ADMIN — OXYCODONE HYDROCHLORIDE 5 MG: 5 TABLET ORAL at 00:02

## 2017-01-19 RX ADMIN — CEPHALEXIN 500 MG: 500 CAPSULE ORAL at 17:17

## 2017-01-19 RX ADMIN — IBUPROFEN 600 MG: 600 TABLET ORAL at 06:17

## 2017-01-19 RX ADMIN — OXYCODONE HYDROCHLORIDE 5 MG: 5 TABLET ORAL at 14:00

## 2017-01-19 RX ADMIN — ACETAMINOPHEN 650 MG: 325 TABLET, FILM COATED ORAL at 11:39

## 2017-01-19 RX ADMIN — HYDROMORPHONE HYDROCHLORIDE 0.2 MG: 10 INJECTION, SOLUTION INTRAMUSCULAR; INTRAVENOUS; SUBCUTANEOUS at 06:53

## 2017-01-19 ASSESSMENT — PAIN DESCRIPTION - DESCRIPTORS: DESCRIPTORS: SORE

## 2017-01-19 NOTE — PLAN OF CARE
Problem: Goal Outcome Summary  Goal: Goal Outcome Summary  Outcome: Improving  Pt c/o abdominal soreness. Pain controlled with Ibuprofen. Incision packed by MD. Dressing CDI. Voiding without difficulty. Scant vaginal drainage. Pt has baby in room. Mother present and caring for baby. Family encouraged to be independent in caring of baby. Pt needing assist of 1 with setting up breast pump. Pt pumping and dumping. Call light within reach.

## 2017-01-19 NOTE — ANESTHESIA CARE TRANSFER NOTE
Patient: Claudine Wells    EXAM UNDER ANESTHESIA ABDOMEN (N/A Abdomen)  COMBINED INCISION AND DRAINAGE ABDOMEN WASHOUT (N/A Trunk)  Additional InformationProcedure(s):  I & D Abdominal wound - Wound Class: IV-Dirty or Infected   - Wound Class: IV-Dirty or Infected    Diagnosis: Wound Infection  Diagnosis Additional Information: No value filed.    Anesthesia Type:   MAC     Note:  Airway :Face Mask  Patient transferred to:Medical/Surgical Unit        Vitals: (Last set prior to Anesthesia Care Transfer)              Electronically Signed By: RAQUEL Villa CRNA  January 19, 2017  9:10 AM

## 2017-01-19 NOTE — PROGRESS NOTES
[facility]  Met with pt to discuss plans for HC.  Pt to be discharged home 01/19/17 and has agreed to have [facility] follow with services of . Patient care support center processing referral.  Pt verbalized understanding that initial visit is scheduled for 01/20/17.   Pt has 24 hour phone number for [facility] for any questions or concerns.

## 2017-01-19 NOTE — DISCHARGE SUMMARY
Discharge Summary    Claudine Wells MRN# [id number]   YOB: 1995 Age: 22 year old     Date of Admission:  1/18/2017  Date of Discharge:  1/20/2017  Admitting Physician:  Taylor Hale MD  Discharge Physician:  Jocelyn Abdalla MD  Discharging Service:  Gynecology     Primary Provider: [facility]          Admission Diagnoses:   23 yo   POD#7 s/p PLTCS  Wound infection with surrounding cellulitis          Discharge Diagnosis:   Same             Discharge Disposition:   Discharged to home           Procedures:   EUA, wound exploration, I & D, washout, wound packing  MAC anesthesia  Wound packing changes          Medications Prior to Admission:       Current Facility-Administered Medications on File Prior to Encounter:  lidocaine-EPINEPHrine 1.5 %-1:247089 injection   lidocaine 1 % injection   bupivacaine (MARCAINE) 0.125 % injection (diluted from stock concentration by MD or CRNA)     Current Outpatient Prescriptions on File Prior to Encounter:  oxyCODONE (ROXICODONE) 5 MG IR tablet Take 1-2 tablets (5-10 mg) by mouth every 3 hours as needed for moderate to severe pain   acetaminophen (TYLENOL) 325 MG tablet Take 2 tablets (650 mg) by mouth every 4 hours as needed for other (surgical pain)   ibuprofen (ADVIL/MOTRIN) 400 MG tablet Take 1-2 tablets (400-800 mg) by mouth every 6 hours as needed for other (cramping)   senna-docusate (SENOKOT-S;PERICOLACE) 8.6-50 MG per tablet Take 1-2 tablets by mouth 2 times daily   ferrous gluconate (FERGON) 324 (38 FE) MG tablet Take 1 tablet (324 mg) by mouth daily (with breakfast)   Prenatal Vit-Fe Fumarate-FA (GNP PRENATAL VITAMINS) 28-0.8 MG TABS Take 1 tablet by mouth daily   Azelaic Acid 15 % gel Massage thin film gently into afected areas of the face twice a day morning and evening.   cholecalciferol (VITAMIN D3) 5000 UNITS CAPS capsule Take 1 capsule (5,000 Units) by mouth daily Take one capsule daily.              Discharge Medications:       Claudine Wells   Home Medication Instructions IVELISSE:[id number]    Printed on:17 8038   Medication Information                      acetaminophen (TYLENOL) 325 MG tablet  Take 2 tablets (650 mg) by mouth every 4 hours as needed for other (surgical pain)             Azelaic Acid 15 % gel  Massage thin film gently into afected areas of the face twice a day morning and evening.             cephALEXin (KEFLEX) 500 MG capsule  Take 1 capsule (500 mg) by mouth every 6 hours for 8 days             cholecalciferol (VITAMIN D3) 5000 UNITS CAPS capsule  Take 1 capsule (5,000 Units) by mouth daily Take one capsule daily.             ferrous gluconate (FERGON) 324 (38 FE) MG tablet  Take 1 tablet (324 mg) by mouth daily (with breakfast)             ibuprofen (ADVIL/MOTRIN) 400 MG tablet  Take 1-2 tablets (400-800 mg) by mouth every 6 hours as needed for other (cramping)             oxyCODONE (ROXICODONE) 5 MG IR tablet  Take 1 tablet (5 mg) by mouth every 3 hours as needed for moderate to severe pain             oxyCODONE-acetaminophen (PERCOCET) 5-325 MG per tablet  Take 1-2 tablets by mouth every 4 hours as needed for pain             Prenatal Vit-Fe Fumarate-FA (GNP PRENATAL VITAMINS) 28-0.8 MG TABS  Take 1 tablet by mouth daily             senna-docusate (SENOKOT-S;PERICOLACE) 8.6-50 MG per tablet  Take 1-2 tablets by mouth 2 times daily                         Consultations:   None             Brief History of Illness:   Claudine Wells was admitted as a 23yo  POD#7 s/p PLTCS, with likely post-operative seroma based on physical exam findings and CT scan results. Discussed with patient recommendation for EUA and wound exploration to determine the extent of her wound separation and evaluate for fascial involvement. She was in agreement with the plan, signed consent obtained. Procedure did ultimately reveal wound infection, so wound was opened and packed.            Hospital Course:   Patient underwent the above  procedure for wound infection, and had wound packed while in hospital. She spiked a fever to 100.8 on POD#1, however this was not felt to be due to atelectasis so she was encouraged to use IS. She did not spike any further fevers. However Hg on admission was 7.0 and on POD# 2 was found to be 6.6. There was no suspicion of abdominal bleeding however due to her infection, there was concern for poor wound healing due to anemia. She was therefore transfused one unit of blood. Hg following transfusion was 7.9.  On the day of discharge, she was ambulating, voiding, tolerating regular diet, and had pain adequately controlled on oral medications. Home care was set up for home packing changes, with plan for follow up in clinic in 1 week.                 Discharge Instructions and Follow-Up:   Discharge diet: Regular   Discharge activity: Activity as tolerated   Discharge follow-up: Follow up with Dr. Hale in 1 week, consider wound vac at that time.   Other instructions: Home health to see pt daily for dressing changes     Saulo Garcia  1/19/2017 8:11 AM      Staff MD Note    I evaluated the patient on the day of discharge.  We reviewed discharge instructions.  Patient stable for discharge.    Jocelyn Abdalla MD

## 2017-01-19 NOTE — PLAN OF CARE
Problem: Goal Outcome Summary  Goal: Goal Outcome Summary  Outcome: Improving  A&O. VSS, except did initiate LA protocol at 2230. Physical assessment baseline. Abdominal dressing changed x1 dt saturation (serosang.) PRN IV dilaudid order received for dressing changes. Pt is extremely anxious and unsure of current health situation. Extensive education received, needs reinforcement. [place] and mother present. OB MD came and consulted with pt regarding breastfeeding/ medication questions. Lactation consult order placed for tomorrow- am nurse to call and coordinate. Voiding w/o difficulty. Advanced to full liquid diet, yet to try. Denied acute distresses, except pain at this time. Appears stable.      OB MD did state that pt only mobility restriction is 10 pound lifting restriction

## 2017-01-19 NOTE — PROGRESS NOTES
Care Coordinator Progress Note     Admission Date/Time:  1/18/2017  Attending MD:  Taylor Hale, *     Data  Chart reviewed, discussed with interdisciplinary team.   Patient was admitted s/p PLTCS, now POD #1 s/p debridement of wound infection with packing.    Concerns with insurance coverage for discharge needs: None.  Current Living Situation: Patient lives with family.  Support System: Supportive and Involved  Services Involved: none prior to admission  Transportation: Family or Friend will provide  Barriers to Discharge: none anticipated once medically stable for d/c    Coordination of Care and Referrals: Provided patient/family with options for Home Care.  Patient selected [facility]. Referral initiated for skilled RN assessments and visits.      Assessment  Met with patient at bedside and introduced RNCC role. Patient is english speaking but her family who is providing support and will be learning wound care are [place] speaking. Explained that home RN will oversee wound care and assess pain control (in addition to providing overall assessment) with intention to teach family how to provide wound dressing changes. Patient verbalized understanding and had no additional questions at this time.    Spoke with Dr. Garcia who reports patient will likely be discharged later today.     Plan  Anticipated Discharge Date:  today  Anticipated Discharge Plan:  Home with family support and home care    Mariama Clemons, RN  Care Coordinator  Pager [phone]

## 2017-01-19 NOTE — PLAN OF CARE
Problem: Goal Outcome Summary  Goal: Goal Outcome Summary  Outcome: No Change  January 19, 2017.  2400. Pt just finished pumping. Baby sleeping. Mom in the room with her. C/O incisional pain and headache. Wanted Oxy and Tylenol now. Wants to be awakened for pumping at 0300. Has edema in both feet. Pt says they having been getting worse. Scant brownish drainage from erik area. Eating a popsicle without problems,  P: Monitor closely.  Supportive cares. Medicate for pain as needed.

## 2017-01-19 NOTE — PROGRESS NOTES
"Writer called to help patient with setting up hospital breastpump.  Per pt, she was advised to use breastpump to \"get the medicine from surgery out of my milk\".  Writer spoke with pharmacist and charge RN on postpartum to determine compatibility of medications and breastfeeding.  Per pharmacist, keflex does pass to baby through breastmilk and can cause diarrhea.  Per charge nurse on postpartum, keflex is classified as L1, meaning that it does pass to baby but is considered compatible.  Versed and Propofol are L2, which is also considered compatible.  Oxycodone is L3, which is probably compatible up to 40mg per day.  Educated mother on compatibilities and risks.  Mother has been giving infant formula since surgery today and has chosen to pump and waste breastmilk for the time being.  Recommended pumping on maintinence setting until breasts empty (about 20 minutes) every 3 hours.   "

## 2017-01-19 NOTE — PROGRESS NOTES
In to see patient regarding questions about medications and breastfeeding. Lactation consult placed as well per RN request. Patient had been told to pump and dump. She was then seen by L&D/Postpartum RN who spoke with pharmacist and postpartum charge RN, and counseled patient that it would be safe to breastfeed while taking keflex and oxycodone (up to 40 mg per day).     Discussed this further with patient. Keflex is considered safe during breasfeeding. Only a very small amount of keflex is secreted in breast milk, which could potentially cause diarrhea in the infant. Oxycodone up to 40 mg daily is considered safe and minimal amounts are secreted in breast milk. The patient still desires to pump and dump overnight, and feed the infant with formula. She states she will likely resume breastfeeding in the morning.    Joyce Chinchilla MD PGY3  Department of OB/GYN  1/18/2017 10:00 PM

## 2017-01-19 NOTE — PLAN OF CARE
Problem: Goal Outcome Summary  Goal: Goal Outcome Summary  Outcome: Improving  No change. Alert and oriented. Able to make needs known. Call light in reach. Abdominal pain adequately managed with Tylenol and Oxycodone 5 mg. Abdominal dressing CDI. Pumping and dumping. Up with assist of 1. Ambulated  with a walker to BR. Voiding without difficulty. Passing flatus. Needed assistance with putting on pull up. Has scant vaginal drainage  Denies nausea, HA, dizziness, SOB or CP.  Mother and infant at bedside. Appears to be sleeping soundly during rounds. Continue with plan of care

## 2017-01-19 NOTE — PLAN OF CARE
Problem: Goal Outcome Summary  Goal: Goal Outcome Summary  Outcome: Improving  Pt c/o abd pain radiating into lower back with headache. Pt previously medicated with oxycodone and Ibuprofen. Abd soft. BS+. Dressing CDI. Temp 100.8 orally. LS decreased at bases. Pt refused to ambulate due to pain. Found patient with oxygen mask on. Pt states she asked her mother to put it on her because she was not feeling well.  Pt asked not to touch oxygen unless staff helps her and at this time she does not need it. Sat's 96% on RA. IS given to patient. Pt weakly using it up to 250-500. Pt encouraged to use IS hourly. Medicated with tylenol for discomfort and temperature. Encouraged to push fluids. Will recheck temp. Pt needs frequent reminders to use breast pump. Schedule written on white board in room as reminder.  Call light within reach. On call OB resident notified of patients temp and status. Recheck of temp 100.4 orally    Resident in to see patient. Patient will stay overnight and possibly placed on IV antibiotics.  [facility] called to update that patient will be staying in hospital.

## 2017-01-19 NOTE — PROGRESS NOTES
Gynecology Progress Note     S: Pt feels ok. Pain reasonably controlled.  No SOB, CP.  No nausea.     O:  Filed Vitals:    01/18/17 2244 01/18/17 2327 01/19/17 0454 01/19/17 0709   BP: 118/43 127/61 119/42 124/50   Pulse: 83 84 100 68   Temp: 97.3  F (36.3  C)  97.9  F (36.6  C) 96.9  F (36.1  C)   TempSrc: Oral  Oral Oral   Resp: 24 18 16   SpO2: 99%   98%   Gen: comfortable, no acute distress  Lungs: appropriate work of breathing  Abd: soft, appropriately tender.  Incision unpacked and inspected, still with healthy appearing granulation tissue and small amount of serous fluid from base.  Re-packed with kerlix; skin edge with stable erythema/induration  Ext: warm and well perfused, SCDs in place, 1-2+ edema.    HEMOGLOBIN   Date Value Ref Range Status   01/18/2017 7.1* 11.7 - 15.7 g/dL Final   01/13/2017 8.0* 11.7 - 15.7 g/dL Final     A/P:Claudine Wells is a 22 year old female POD#8 s/p PLTCS, now POD #1 s/p debridement of wound infection with packing  Wound infection: Packing BID currently; with stable appearance may be able to consider daily dressing changes as early as tomorrow.  Pt seemed very uncomfortable with idea of packing incision on her own, and at time of my visit there was no family member to teach.  I anticipate that she will require home RN cares for at least a few days' dressing changes, until a family member could be reliably taught to perform.  Counseled the pt that this will likely take a few weeks to heal completely.  Could consider wound vacuum in a few days if infection resolves.  Continue keflex for likely 7-10 days given surrounding cellulitis which is stable  Leukocytosis, WBC 15 on admit; no evidence of sepsis, and improving wound infection.  Monitor clinically, low threshold to trend if signs of systemic infection  FEN: regular diet  Pain: doing well on tylenol, ibuprofen, oxycodone; has used dilaudid IV bumps with prior dressing changes, will DC now in anticipation of discharge  :  voiding spontaneously  Heme: Hgb 8> 7.1> EBL 10; asymptomatic.  Continue oral Fe and monitor clinically; low threshold to repeat if abnormal bleeding, or transfuse if symptomatic of anemia    Dispo: Anticipate discharge to home when able to confirm home RN cares for dressing changes (see above), potentially later today    Saulo Garcia MD  OB/GYN PGY4  Pg. [phone]

## 2017-01-20 VITALS
RESPIRATION RATE: 16 BRPM | DIASTOLIC BLOOD PRESSURE: 51 MMHG | OXYGEN SATURATION: 98 % | TEMPERATURE: 98.4 F | HEART RATE: 95 BPM | SYSTOLIC BLOOD PRESSURE: 109 MMHG

## 2017-01-20 LAB
ABO + RH BLD: NORMAL
ABO + RH BLD: NORMAL
BACTERIA SPEC CULT: ABNORMAL
BLD GP AB SCN SERPL QL: NORMAL
BLD PROD TYP BPU: NORMAL
BLD PROD TYP BPU: NORMAL
BLD UNIT ID BPU: 0
BLOOD BANK CMNT PATIENT-IMP: NORMAL
BLOOD PRODUCT CODE: NORMAL
BPU ID: NORMAL
ERYTHROCYTE [DISTWIDTH] IN BLOOD BY AUTOMATED COUNT: 13.6 % (ref 10–15)
HCT VFR BLD AUTO: 20.7 % (ref 35–47)
HGB BLD-MCNC: 6.6 G/DL (ref 11.7–15.7)
HGB BLD-MCNC: 7.9 G/DL (ref 11.7–15.7)
LACTATE BLD-SCNC: 1.2 MMOL/L (ref 0.7–2.1)
MCH RBC QN AUTO: 27.5 PG (ref 26.5–33)
MCHC RBC AUTO-ENTMCNC: 31.9 G/DL (ref 31.5–36.5)
MCV RBC AUTO: 86 FL (ref 78–100)
MICRO REPORT STATUS: ABNORMAL
NUM BPU REQUESTED: 1
PLATELET # BLD AUTO: 271 10E9/L (ref 150–450)
RBC # BLD AUTO: 2.4 10E12/L (ref 3.8–5.2)
SPECIMEN EXP DATE BLD: NORMAL
SPECIMEN SOURCE: ABNORMAL
TRANSFUSION STATUS PATIENT QL: NORMAL
TRANSFUSION STATUS PATIENT QL: NORMAL
WBC # BLD AUTO: 14 10E9/L (ref 4–11)

## 2017-01-20 PROCEDURE — 25000132 ZZH RX MED GY IP 250 OP 250 PS 637: Performed by: OBSTETRICS & GYNECOLOGY

## 2017-01-20 PROCEDURE — 36415 COLL VENOUS BLD VENIPUNCTURE: CPT | Performed by: OBSTETRICS & GYNECOLOGY

## 2017-01-20 PROCEDURE — 36416 COLLJ CAPILLARY BLOOD SPEC: CPT | Performed by: OBSTETRICS & GYNECOLOGY

## 2017-01-20 PROCEDURE — 85018 HEMOGLOBIN: CPT | Performed by: OBSTETRICS & GYNECOLOGY

## 2017-01-20 PROCEDURE — P9016 RBC LEUKOCYTES REDUCED: HCPCS | Performed by: STUDENT IN AN ORGANIZED HEALTH CARE EDUCATION/TRAINING PROGRAM

## 2017-01-20 PROCEDURE — 83605 ASSAY OF LACTIC ACID: CPT | Performed by: OBSTETRICS & GYNECOLOGY

## 2017-01-20 PROCEDURE — 85027 COMPLETE CBC AUTOMATED: CPT | Performed by: OBSTETRICS & GYNECOLOGY

## 2017-01-20 PROCEDURE — 25000132 ZZH RX MED GY IP 250 OP 250 PS 637: Performed by: STUDENT IN AN ORGANIZED HEALTH CARE EDUCATION/TRAINING PROGRAM

## 2017-01-20 RX ORDER — OXYCODONE HYDROCHLORIDE 5 MG/1
5 TABLET ORAL ONCE
Status: COMPLETED | OUTPATIENT
Start: 2017-01-20 | End: 2017-01-20

## 2017-01-20 RX ORDER — OXYCODONE HYDROCHLORIDE 5 MG/1
5 TABLET ORAL
Status: DISCONTINUED | OUTPATIENT
Start: 2017-01-20 | End: 2017-01-20 | Stop reason: HOSPADM

## 2017-01-20 RX ORDER — OXYCODONE HYDROCHLORIDE 5 MG/1
5 TABLET ORAL
Qty: 30 TABLET | Refills: 0 | Status: SHIPPED | OUTPATIENT
Start: 2017-01-20

## 2017-01-20 RX ADMIN — OXYCODONE HYDROCHLORIDE 10 MG: 5 TABLET ORAL at 04:40

## 2017-01-20 RX ADMIN — FERROUS GLUCONATE 324 MG: 324 TABLET ORAL at 08:02

## 2017-01-20 RX ADMIN — CEPHALEXIN 500 MG: 500 CAPSULE ORAL at 00:38

## 2017-01-20 RX ADMIN — OXYCODONE HYDROCHLORIDE 5 MG: 5 TABLET ORAL at 06:40

## 2017-01-20 RX ADMIN — ACETAMINOPHEN 650 MG: 325 TABLET, FILM COATED ORAL at 08:02

## 2017-01-20 RX ADMIN — ACETAMINOPHEN 650 MG: 325 TABLET, FILM COATED ORAL at 16:16

## 2017-01-20 RX ADMIN — IBUPROFEN 600 MG: 600 TABLET ORAL at 19:15

## 2017-01-20 RX ADMIN — SENNOSIDES AND DOCUSATE SODIUM 2 TABLET: 8.6; 5 TABLET ORAL at 08:02

## 2017-01-20 RX ADMIN — IBUPROFEN 600 MG: 600 TABLET ORAL at 10:55

## 2017-01-20 RX ADMIN — ACETAMINOPHEN 650 MG: 325 TABLET, FILM COATED ORAL at 00:38

## 2017-01-20 RX ADMIN — CEPHALEXIN 500 MG: 500 CAPSULE ORAL at 06:40

## 2017-01-20 RX ADMIN — OXYCODONE HYDROCHLORIDE 5 MG: 5 TABLET ORAL at 16:16

## 2017-01-20 RX ADMIN — OXYCODONE HYDROCHLORIDE 10 MG: 5 TABLET ORAL at 00:38

## 2017-01-20 RX ADMIN — CEPHALEXIN 500 MG: 500 CAPSULE ORAL at 12:17

## 2017-01-20 RX ADMIN — CEPHALEXIN 500 MG: 500 CAPSULE ORAL at 19:15

## 2017-01-20 RX ADMIN — ACETAMINOPHEN 650 MG: 325 TABLET, FILM COATED ORAL at 12:17

## 2017-01-20 RX ADMIN — IBUPROFEN 600 MG: 600 TABLET ORAL at 04:39

## 2017-01-20 NOTE — PLAN OF CARE
Problem: Goal Outcome Summary  Goal: Goal Outcome Summary  Frequent requests and questions. Saying feeling SOB off and on this shift. I encouraged her to get up and OOB to ambulate in hallways and SOB resolved. O2 sats in the high 90's all shift, other VSS. Pain in lower abdominal wound controlled with Tylenol and Ibuprofen. Wet to dry dressing of lower abdomen changed by MD early this morning. Dressing remains intact with minimal drainage.  Received one unit PRBC this shift. Hbg draw will be done at 1500. Baby in room and breast feeding, baby using a nipple shield.

## 2017-01-20 NOTE — PLAN OF CARE
Problem: Goal Outcome Summary  Goal: Goal Outcome Summary  Outcome: No Change  January 20, 2017. Note for the night shift.  D:  Eating her supper tray at the start of the shift. IV bothering her and wanting it out. IV was removed, not on any scheduled IV meds. Requesting formula for the baby. Baby in the room with her along with her mom. C/O pain at the start of the shift and received Tylenol and Oxy. Up with assistance to the bathroom. Able to make needs known. Call light with in reach. This AM triggered the Sepsis alert. CHeck with the OB resident and he wanted the Sepsis alert done. Results were normal. OB resident changed her packing after an extra 5 mg of Oxy this AM. Pt pumped and dumped at least x1 tonight. OB  said she can breast feed if she can get her OXY intake less than 40 mg /day.  Temp OK tonight. Hgb low this AM and  Aware and ordered a unit of PC's today.    A: Doing OK.P: Monitor closely.  Supportive cares. Medicate for pain as needed. Monitor Temp tonight. Place an IV and give blood today.

## 2017-01-20 NOTE — LACTATION NOTE
"S:I have been throwing out my milk because I am afraid of the medication and how it can harm my baby, the keflex. I am pumping every three hours then dumping my milk\".    O: Claudine delivered a full term baby and was breastfeeding with a nipple shield for assistance with latch (per Claudine, baby struggles with latch but the shield works for baby). Claudine delivered via  and then was admitted for an infected wound requiring packing. She is also anemic. In addition to Keflex she has been taking oxycodone for pain.     A: LC encouraged mother to not offer breast or breast milk but to continue to pump and dump until she is taking 40 mg of oxycodone in a 24 hour period. LC reassured mother keflex was safe as was the Ibuprofen or tylenol. LC encouraged mother to continue to pump 8 times in 24 hours. LC will learn if there is an IBCLC at [facility] where dyad receive care if not LC will make an appt in LC clinic at [facility] where this LC has an outpatient clinic and call Claudine with date and time.     P: Both mother (Claudine) and support person repeated back education to LC.   "

## 2017-01-20 NOTE — PROGRESS NOTES
Care Coordinator- Discharge Planning     Admission Date/Time:  2017  Attending MD:  Taylor Hale, *     Data  Date of initial CC assessment:    Chart reviewed, discussed with interdisciplinary team.   Patient was admitted for:   1. Wound infection    2. S/P  section         Assessment  Full assessment completed in previous note    Coordination of Care and Referrals: Discharge plans were delayed after patient had fever yesterday evening. Per Dr. Saulo Garcia and Dr. Abdalla of OB/Gyn team, patient is receiving 1 unit PRBC this morning for Hgb of 6.6. Scheduled recheck for 1300 and it is planned that patient will discharge home this afternoon if Hgb improved.    [facility] is aware patient's d/c was delayed. Home care order updated to reflect start of care as 17 as long as patient indeed is safe to discharge later today.      Plan  Anticipated Discharge Date:  Today if cleared by MD team after blood transfusion  Anticipated Discharge Plan:  Home with family support and home care    CTS Handoff completed:  YES    Mariama Clemons RN  Care Coordinator  Pager [phone]

## 2017-01-20 NOTE — PROGRESS NOTES
Gynecology Progress Note     S: Pt feels ok. Pain reasonably controlled.  No SOB, CP.  No nausea/vomiting.  No other systemic complaints    O:  Filed Vitals:    01/20/17 0655 01/20/17 0715 01/20/17 0919 01/20/17 0948   BP: 123/65  117/62 118/57   Pulse: 96  90 93   Temp: 98.9  F (37.2  C)  98.7  F (37.1  C) 99.3  F (37.4  C)   TempSrc: Oral  Oral Oral   Resp: 16  16 16   SpO2: 100% 100% 99% 98%   Gen: comfortable, no acute distress  Breasts: observed pt pumping and with lactation staff; no areas of erythema or isolated tenderness  Lungs: appropriate work of breathing  Abd: soft, appropriately tender.  Incision unpacked and inspected, still with healthy appearing granulation tissue and small amount of serous fluid from base.  Re-packed with kerlix; skin edge with stable erythema/induration  Ext: warm and well perfused, SCDs in place, 1-2+ edema.    HEMOGLOBIN   Date Value Ref Range Status   01/20/2017 6.6* 11.7 - 15.7 g/dL Final     Comment:     This result has been called to TAMMY DARDEN RN by Hilary Gimenez on 01 20 2017 at 0657, and has been read back.     01/19/2017 6.9* 11.7 - 15.7 g/dL Final     Comment:     This result has been called to FANY SOTO by Hasmukh Beltran on 01 19 2017 at   1912, and has been read back.       A/P:Claudine Wells is a 22 year old female POD#9 s/p PLTCS, now POD #2 s/p debridement of wound infection with packing  Wound infection: Packing daily.  Home cares with RN dressing changes daily are set up.  Counseled the pt that this will likely take a few weeks to heal completely.  Could consider wound vacuum in next 1-2 weeks if infection resolves.  Continue keflex for likely 7-10 days given surrounding cellulitis which is stable  Low grade fever yesterday, leukocytosis, WBC 15> 13 on admit; no evidence of sepsis.  Ddx otherwise includes atalectasis (pt using IS), VTE (using SCDs when in bed and no s/sx of VTE), mastitis (breasts normal on exam this morning)  Tachycardia, mild, likely  2/2 anemia but again continue to monitor clinically for signs of systemic infection  Breastfeeding; patient continues to express that she thinks she cannot breastfeed; I have reinforced multiple times that this is not true, and that aside from a small risk of GI upset / diarrhea for infants who receive antibiotics through breastfeeding, that it is safe for her to breastfeed.  Oxycodone order capped to 40mg daily, reviewed with lactation  FEN: regular diet  Pain: doing well on tylenol, ibuprofen, oxycodone; has used dilaudid IV bumps with prior dressing changes, will DC now in anticipation of discharge  : voiding spontaneously  Heme: Hgb 8> 7.1> EBL 10>6.9; discuss transfusion with patient today and she agrees, will check post-transfusion Hgb and ensure appropriate rise, will DC with iron    Dispo: Anticipate discharge to home when able to confirm home RN cares for dressing changes (see above), potentially later today    Saulo Garcia MD (PGY-4)  1/20/2017 6:28 AM      Staff MD Note    I appreciate the note by Dr. Garcia.  Any necessary changes have been made by me.  I evaluated the patient with the resident and agree with the assessment and plan.    Jocelyn Abdalla MD

## 2017-01-20 NOTE — DISCHARGE INSTRUCTIONS
___________Your baby has an appointment with the __Lactation Consultant at [facility] ([address] [place] [address]) on February 1, 2017 at 2pm.    [phone] Clinic Phone number.   _________________________________________________________  Local Resources for Breastfeeding Support in [place]:   [employer] of the [place] and [facility] http://www.londas.org/ or call 663-967-4721   Women, Infants and Children (WIC) Program http://www.health.[facility].[place]/divs/fh/wic/bf/index.html or call 1-945.677.6294   Infant Risk Center (up-to-date, evidence-based information on the use of medications during pregnancy and breastfeeding) http://www.infantrisk.com/ or call 1-758.588.7764  _______________________________________________________________________________

## 2017-01-20 NOTE — PROGRESS NOTES
Interim progress note    Called to patient room for fever to 100.8. Pt reports feeling chilly this morning, no chills this evening. Also reports shortness of breath, which has been stable since admission. Reports increased abdominal/incisional pain. Denies nausea/vomiting, diarrhea, chest pain, cough. Denies calf pain or tenderness. She continues to be mainly sedentary due to concerns that her packing will fall out. She has used her IS two times in the past hour.    Filed Vitals:    01/19/17 0454 01/19/17 0709 01/19/17 1602 01/19/17 1714   BP: 119/42 124/50 118/67    Pulse: 100 68 104    Temp: 97.9  F (36.6  C) 96.9  F (36.1  C) 100.8  F (38.2  C) 100.4  F (38  C)   TempSrc: Oral Oral Oral Oral   Resp: 18 16 16    SpO2:  98% 96%      Gen: in no distress, sitting comfortably in bed  CV: RRR  Resp: clear to auscultation bilaterally, no wheezing, crackles. Questionable decreased breath sounds at lung bases.  Abd: Soft, mild tenderness to palpation  Incision: Incision with granulation tissue and small amount of serous fluid. Kerlix in place. No change in surrounding erythema.   Ext: SCDs in place, 2+ edema. No calf tenderness or erythema    A/P: 23 yo POD#8 s/p PLTCS, POD#1 s/p debridement of wound infection with packing, now with new fever. Unlikely to be infectious etiology due to low-grade fever, may be due to atelectasis from patient being sedentary since admission.    -Consider transitioning to IV antibiotics or broadening antibiotics   -Again reemphasized the importance of ambulating both to prevent DVTs and to speed recovery  -Instructed patient to use her IS at least 10 times per hour    Lorena Maravilla MD   Resident Physician, PGY1  Obstetrics, Gynecology, and Women's Health

## 2017-01-20 NOTE — LACTATION NOTE
Claudine was called by this LC about an appt with LC at [facility] at 2 pm on 2/1/17. This is now in Claudine's discharge instructions.

## 2017-01-21 NOTE — PLAN OF CARE
Problem: Goal Outcome Summary  Goal: Goal Outcome Summary  Outcome: Adequate for Discharge Date Met:  01/20/17  VSS. Pt able to ambulate independently. Breastfeeding baby. Pain managed with PRN oxycodone, Tylenol and Ibuprofen. Hgb came back at 7.9. Wound dressing changed. Discharge paperwork reviewed with pt, all questions answered. Pt received home medications. PIV removed. [facility] with mother and baby in car seat.

## 2017-01-24 ENCOUNTER — TELEPHONE (OUTPATIENT)
Dept: OBGYN | Facility: CLINIC | Age: 22
End: 2017-01-24

## 2017-01-25 LAB
BACTERIA SPEC CULT: ABNORMAL
Lab: ABNORMAL
MICRO REPORT STATUS: ABNORMAL
SPECIMEN SOURCE: ABNORMAL

## 2017-01-26 ENCOUNTER — OFFICE VISIT (OUTPATIENT)
Dept: OBGYN | Facility: CLINIC | Age: 22
End: 2017-01-26
Attending: OBSTETRICS & GYNECOLOGY
Payer: COMMERCIAL

## 2017-01-26 VITALS
DIASTOLIC BLOOD PRESSURE: 86 MMHG | BODY MASS INDEX: 40.1 KG/M2 | SYSTOLIC BLOOD PRESSURE: 127 MMHG | WEIGHT: 248.3 LBS | HEART RATE: 58 BPM

## 2017-01-26 PROCEDURE — 99212 OFFICE O/P EST SF 10 MIN: CPT

## 2017-01-26 ASSESSMENT — PAIN SCALES - GENERAL: PAINLEVEL: NO PAIN (0)

## 2017-01-26 NOTE — Clinical Note
1/26/2017       RE: Claudine Wells  [address]   [facility] [address]     Dear Colleague,    Thank you for referring your patient, Claudine Wells, to the [facility] at [facility]. Please see a copy of my visit note below.    [facility] Visit    CC: wound check    S: 22 year old  here for wound check. Had primary CS complicated by wound seroma which was opened in OR and is now healing by secondary intention. Has home nurse for daily dressing changes. Reports pain is improved with wound being open. Denies fever, foul smelling drainage. Does have itching around incision site.     O: /86 mmHg  Pulse 58  Wt 112.628 kg (248 lb 4.8 oz)  LMP   General: No distress  Abdomen: Soft, non-tender, non-distended, no masses  Incision: Large amount of paper tape removed from dressing, some skin breakdown on right aspect pfannensteil. Pink granulation tissue with bleeding edges with removal of packing.   wound cleansed, gently debrided and repacked with kerlex gauze.     A:22 year old with wound now healing by seconday intention    P:Continue daily dressing changes  Advised less tape on bandage to prevent skin irritation  Will look into wound vac  Follow up as scheduled        Reyna Justin MD FACOG    Again, thank you for allowing me to participate in the care of your patient.      Sincerely,    Reyna Justin MD

## 2017-01-27 NOTE — PROGRESS NOTES
[facility] Visit    CC: wound check    S: 22 year old  here for wound check. Had primary CS complicated by wound seroma which was opened in OR and is now healing by secondary intention. Has home nurse for daily dressing changes. Reports pain is improved with wound being open. Denies fever, foul smelling drainage. Does have itching around incision site.     O: /86 mmHg  Pulse 58  Wt 112.628 kg (248 lb 4.8 oz)  LMP   General: No distress  Abdomen: Soft, non-tender, non-distended, no masses  Incision: Large amount of paper tape removed from dressing, some skin breakdown on right aspect pfannensteil. Pink granulation tissue with bleeding edges with removal of packing.   wound cleansed, gently debrided and repacked with kerlex gauze.     A:22 year old with wound now healing by seconday intention    P:Continue daily dressing changes  Advised less tape on bandage to prevent skin irritation  Will look into wound vac  Follow up as scheduled        Reyna Justin MD FACOG

## 2017-01-31 ENCOUNTER — TELEPHONE (OUTPATIENT)
Dept: OBGYN | Facility: CLINIC | Age: 22
End: 2017-01-31

## 2017-01-31 ENCOUNTER — OFFICE VISIT (OUTPATIENT)
Dept: OBGYN | Facility: CLINIC | Age: 22
End: 2017-01-31
Attending: OBSTETRICS & GYNECOLOGY
Payer: COMMERCIAL

## 2017-01-31 VITALS
BODY MASS INDEX: 37.19 KG/M2 | SYSTOLIC BLOOD PRESSURE: 115 MMHG | HEART RATE: 81 BPM | WEIGHT: 230.3 LBS | DIASTOLIC BLOOD PRESSURE: 76 MMHG

## 2017-01-31 DIAGNOSIS — T14.8XXA OPEN WOUND: Primary | ICD-10-CM

## 2017-01-31 RX ORDER — OXYCODONE AND ACETAMINOPHEN 5; 325 MG/1; MG/1
1 TABLET ORAL EVERY 4 HOURS PRN
Qty: 20 TABLET | Refills: 0 | Status: SHIPPED | OUTPATIENT
Start: 2017-01-31 | End: 2017-02-13

## 2017-01-31 ASSESSMENT — PAIN SCALES - GENERAL: PAINLEVEL: SEVERE PAIN (6)

## 2017-01-31 NOTE — TELEPHONE ENCOUNTER
Spoke with Rosa Elena Chow's nurse  for home care phone number [phone]    Nurse gave Danika instructions from Dr. Hale which included having Claudine shower daily and not be worried about wound getting wet. Also not using as much tape and letting wound get some airflow. Informed Danika that Dr. Hale is happy with the progress of the wound and does not see signs of infection. She debrided the wound and got the blood flowing and lightly packed and irasema taped sides of ABD.    Danika was in agreement with this plan and suspects that the family member alter the dressings when the nurses leave.  She will contact us with any further questions.

## 2017-01-31 NOTE — PROGRESS NOTES
Here for wound check.     O: afebrile  Filed Vitals:    01/31/17 1319   BP: 115/76   Pulse: 81   Weight: 104.463 kg (230 lb 4.8 oz)     AA nad  Incision undressed.   Moderate fibrin and exudate in wound.  Debrided gently to bleeding tissue.  Does not track. No infection noted. 8 cm width x 4 cm depth    Repacked w/ moist kerlex.     A/p: Open wound. Well healing  rx for percocet  Advised minimal wound tape and minimal coverage.   rec shower prior to wound dressing changes  F/u 1 wks    Taylor Hale MD, FACOG  [facility]  OB/GYN    1/31/2017  1:43 PM

## 2017-01-31 NOTE — Clinical Note
1/31/2017       RE: Claudine Wells  [address]   [facility] [address]     Dear Colleague,    Thank you for referring your patient, Claudine Wells, to the [facility] at [facility]. Please see a copy of my visit note below.    Here for wound check.     O: afebrile  Filed Vitals:    01/31/17 1319   BP: 115/76   Pulse: 81   Weight: 104.463 kg (230 lb 4.8 oz)     AA nad  Incision undressed.   Moderate fibrin and exudate in wound.  Debrided gently to bleeding tissue.  Does not track. No infection noted. 8 cm width x 4 cm depth    Repacked w/ moist kerlex.     A/p: Open wound. Well healing  rx for percocet  Advised minimal wound tape and minimal coverage.   rec shower prior to wound dressing changes  F/u 1 wks    Taylor Hale MD, FACOG  [facility]  OB/GYN    1/31/2017  1:43 PM      Again, thank you for allowing me to participate in the care of your patient.      Sincerely,    Taylor Hale MD

## 2017-02-02 ENCOUNTER — TELEPHONE (OUTPATIENT)
Dept: OBGYN | Facility: CLINIC | Age: 22
End: 2017-02-02

## 2017-02-02 NOTE — TELEPHONE ENCOUNTER
Spoke to Paul at  [facility] and gave her vo to decrease visits to 3/week. They were able to instruct Claudine on daily wound care. They will fax order needing to be signed.

## 2017-02-13 ENCOUNTER — HOSPITAL ENCOUNTER (EMERGENCY)
Facility: CLINIC | Age: 22
Discharge: HOME | End: 2017-02-13
Attending: EMERGENCY MEDICINE | Admitting: EMERGENCY MEDICINE
Payer: COMMERCIAL

## 2017-02-13 ENCOUNTER — APPOINTMENT (OUTPATIENT)
Dept: GENERAL RADIOLOGY | Facility: CLINIC | Age: 22
End: 2017-02-13
Attending: EMERGENCY MEDICINE
Payer: COMMERCIAL

## 2017-02-13 VITALS
OXYGEN SATURATION: 100 % | HEART RATE: 134 BPM | WEIGHT: 224.3 LBS | SYSTOLIC BLOOD PRESSURE: 113 MMHG | DIASTOLIC BLOOD PRESSURE: 81 MMHG | RESPIRATION RATE: 22 BRPM | TEMPERATURE: 102.7 F | BODY MASS INDEX: 36.2 KG/M2

## 2017-02-13 DIAGNOSIS — J10.1 INFLUENZA A: ICD-10-CM

## 2017-02-13 LAB
DEPRECATED S PYO AG THROAT QL EIA: NORMAL
FLUAV+FLUBV AG SPEC QL: ABNORMAL
FLUAV+FLUBV AG SPEC QL: POSITIVE
MICRO REPORT STATUS: NORMAL
SPECIMEN SOURCE: ABNORMAL
SPECIMEN SOURCE: NORMAL

## 2017-02-13 PROCEDURE — 25000132 ZZH RX MED GY IP 250 OP 250 PS 637: Performed by: EMERGENCY MEDICINE

## 2017-02-13 PROCEDURE — 87804 INFLUENZA ASSAY W/OPTIC: CPT | Performed by: EMERGENCY MEDICINE

## 2017-02-13 PROCEDURE — 87880 STREP A ASSAY W/OPTIC: CPT | Performed by: EMERGENCY MEDICINE

## 2017-02-13 PROCEDURE — 99284 EMERGENCY DEPT VISIT MOD MDM: CPT | Mod: 25 | Performed by: EMERGENCY MEDICINE

## 2017-02-13 PROCEDURE — 87081 CULTURE SCREEN ONLY: CPT | Performed by: EMERGENCY MEDICINE

## 2017-02-13 PROCEDURE — 99284 EMERGENCY DEPT VISIT MOD MDM: CPT | Mod: Z6 | Performed by: EMERGENCY MEDICINE

## 2017-02-13 PROCEDURE — 71020 XR CHEST 2 VW: CPT

## 2017-02-13 RX ORDER — IBUPROFEN 600 MG/1
600 TABLET, FILM COATED ORAL ONCE
Status: COMPLETED | OUTPATIENT
Start: 2017-02-13 | End: 2017-02-13

## 2017-02-13 RX ADMIN — IBUPROFEN 600 MG: 600 TABLET ORAL at 16:24

## 2017-02-13 ASSESSMENT — ENCOUNTER SYMPTOMS
MYALGIAS: 1
FEVER: 1
VOMITING: 0
DIARRHEA: 0
NAUSEA: 0
SORE THROAT: 1
COUGH: 1
HEADACHES: 1
DYSURIA: 0
DIFFICULTY URINATING: 0

## 2017-02-13 NOTE — DISCHARGE INSTRUCTIONS
Please make an appointment to follow up with Your Primary Care Provider as needed.    While you are still sick, wear a mask when around your baby.    You may want to contact your child's pediatrician to let them know that you have tested positive for influenza.    Rest, stay well hydrated by drinking plain fluids.    Tylenol and/or ibuprofen for pain and/or fever.    Return to the emergency Department for any problems.

## 2017-02-13 NOTE — ED AVS SNAPSHOT
[facility], Emergency Department    [address]    [place] [address]    Phone:  [phone]    Fax:  [phone]                                       Claudine Wells   MRN: [id number]    Department:  [facility], Emergency Department   Date of Visit:  2/13/2017           Patient Information     Date Of Birth          1995        Your diagnoses for this visit were:     Influenza A        You were seen by Son Chi MD.        Discharge Instructions       Please make an appointment to follow up with Your Primary Care Provider as needed.    While you are still sick, wear a mask when around your baby.    You may want to contact your child's pediatrician to let them know that you have tested positive for influenza.    Rest, stay well hydrated by drinking plain fluids.    Tylenol and/or ibuprofen for pain and/or fever.    Return to the emergency Department for any problems.      Discharge References/Attachments     INFLUENZA (ADULT) (ENGLISH)      Future Appointments        Provider Department Dept Phone Center    2/14/2017 9:30 AM Tono Joe MD [facility] [phone] [facility]    2/16/2017 2:15 PM Mili Nunez PA-C [facility] [phone] [facility]      24 Hour Appointment Hotline       To make an appointment at any [facility], call [phone] ([phone]). If you don't have a family doctor or clinic, we will help you find one. [facility] clinics are conveniently located to serve the needs of you and your family.             Review of your medicines      Our records show that you are taking the medicines listed below. If these are incorrect, please call your family doctor or clinic.        Dose / Directions Last dose taken    ibuprofen 400 MG tablet   Commonly known as:  ADVIL/MOTRIN   Dose:  400-800 mg   Quantity:  60 tablet        Take 1-2 tablets (400-800 mg) by mouth every 6 hours as needed for other (cramping)   Refills:  0         "oxyCODONE 5 MG IR tablet   Commonly known as:  ROXICODONE   Dose:  5 mg   Quantity:  30 tablet        Take 1 tablet (5 mg) by mouth every 3 hours as needed for moderate to severe pain   Refills:  0        senna-docusate 8.6-50 MG per tablet   Commonly known as:  SENOKOT-S;PERICOLACE   Dose:  1-2 tablet   Quantity:  60 tablet        Take 1-2 tablets by mouth 2 times daily   Refills:  0                Procedures and tests performed during your visit     Beta strep group A culture    Chest XR,  PA & LAT    Influenza A/B antigen swab    Rapid strep screen      Orders Needing Specimen Collection     None      Pending Results     Date and Time Order Name Status Description    2017 1526 Beta strep group A culture In process             Pending Culture Results     Date and Time Order Name Status Description    2017 1526 Beta strep group A culture In process             Thank you for choosing [facility]       Thank you for choosing [facility] for your care. Our goal is always to provide you with excellent care. Hearing back from our patients is one way we can continue to improve our services. Please take a few minutes to complete the written survey that you may receive in the mail after you visit with us. Thank you!        [employer] Information     [employer] lets you send messages to your doctor, view your test results, renew your prescriptions, schedule appointments and more. To sign up, go to [email or web address] . Click on \"Log in\" on the left side of the screen, which will take you to the Welcome page. Then click on \"Sign up Now\" on the right side of the page.     You will be asked to enter the access code listed below, as well as some personal information. Please follow the directions to create your username and password.     Your access code is: [email or web address]  Expires: 2017  9:30 AM     Your access code will  in 90 days. If you need help or a new code, please call your [facility] clinic or [phone].   "      Care EveryWhere ID     This is your Care EveryWhere ID. This could be used by other organizations to access your [place] medical records  [id number]        After Visit Summary       This is your record. Keep this with you and show to your community pharmacist(s) and doctor(s) at your next visit.

## 2017-02-13 NOTE — ED PROVIDER NOTES
History     Chief Complaint   Patient presents with     Pharyngitis     pt has been sick for the past 2-3 days     HPI  Claudine Wells is a 22 year old female who presents to the Emergency Department with a sore throat. She states that 4-5 days ago she had a cough and sore throat. Patient reports that she has also had fever, headache, and watery eyes along with generalized body aches. She states that her urine has been dark but otherwise denies any urinary symptoms. The patient denies nausea, vomiting, and diarrhea. She reports that her father is sick. The patient got her flu shot this year. She has been taking Tylenol at home.     I have reviewed the Medications, Allergies, Past Medical and Surgical History, and Social History in the [employer] system.    PAST MEDICAL HISTORY  Past Medical History   Diagnosis Date     ADHD (attention deficit hyperactivity disorder)      Anemia      Chlamydia      Gonorrhea      Migraines      PAST SURGICAL HISTORY  Past Surgical History   Procedure Laterality Date      section N/A 2017     Procedure:  SECTION;  Surgeon: Estefany Naylor MD;  Location: [facility] L[facility]D     Exam under anesthesia abdomen N/A 2017     Procedure: EXAM UNDER ANESTHESIA ABDOMEN;  Surgeon: Taylor Hale MD;  Location: [facility] OR     Incision and drainage abdomen washout, combined N/A 2017     Procedure: COMBINED INCISION AND DRAINAGE ABDOMEN WASHOUT;  Surgeon: Taylor Hale MD;  Location: [facility] OR     C/section, classical       , Classical     FAMILY HISTORY  Family History   Problem Relation Age of Onset     DIABETES Mother      ?GDM     DIABETES Father      Coronary Artery Disease No family hx of      Hypertension No family hx of      Breast Cancer No family hx of      Colon Cancer No family hx of      Prostate Cancer No family hx of      Other Cancer No family hx of      Depression No family hx of      Anxiety Disorder No family hx of      Substance Abuse No  family hx of      Anesthesia Reaction No family hx of      Asthma No family hx of      Genetic Disorder No family hx of      Thyroid Disease No family hx of      Obesity No family hx of      SOCIAL HISTORY  Social History   Substance Use Topics     Smoking status: Former Smoker     Packs/day: 1.00     Types: Cigarettes     Smokeless tobacco: Never Used     Alcohol use No     MEDICATIONS  No current facility-administered medications for this encounter.      Current Outpatient Prescriptions   Medication     oxyCODONE (ROXICODONE) 5 MG IR tablet     ibuprofen (ADVIL/MOTRIN) 400 MG tablet     senna-docusate (SENOKOT-S;PERICOLACE) 8.6-50 MG per tablet     Facility-Administered Medications Ordered in Other Encounters   Medication     lidocaine-EPINEPHrine 1.5 %-[id number] injection     lidocaine 1 % injection     bupivacaine (MARCAINE) 0.125 % injection (diluted from stock concentration by MD or CRNA)     ALLERGIES  No Known Allergies     Review of Systems   Constitutional: Positive for fever.   HENT: Positive for sore throat.    Respiratory: Positive for cough.    Gastrointestinal: Negative for diarrhea, nausea and vomiting.   Genitourinary: Negative for difficulty urinating and dysuria.   Musculoskeletal: Positive for myalgias.   Neurological: Positive for headaches.   All other systems reviewed and are negative.      Physical Exam   BP: 125/68  Pulse: 111  Temp: 101.7  F (38.7  C)  Resp: 16  Weight: 101.7 kg (224 lb 4.8 oz)  SpO2: 100 %  Physical Exam   Constitutional: She is oriented to person, place, and time. She appears well-developed and well-nourished.  Non-toxic appearance. She does not appear ill. No distress.   Patient is awake and alert, no acute distress.  She is mentating normally, speaking in complete sentences, and protecting her airway without difficulty.   HENT:   Head: Normocephalic and atraumatic.   Mouth/Throat: Oropharynx is clear and moist. No oropharyngeal exudate.   Eyes: Conjunctivae and EOM are  normal. Pupils are equal, round, and reactive to light. No scleral icterus.   Neck: Normal range of motion. Neck supple. No JVD present. No tracheal deviation present. No thyromegaly present.   No meningeal signs noted.   Cardiovascular: Regular rhythm, normal heart sounds and intact distal pulses.  Tachycardia present.  Exam reveals no gallop and no friction rub.    No murmur heard.  Pulmonary/Chest: Effort normal and breath sounds normal. No respiratory distress.   Abdominal: Soft. Bowel sounds are normal. She exhibits no distension and no mass. There is no tenderness. There is no rebound and no guarding.   Musculoskeletal: Normal range of motion. She exhibits no edema or tenderness.   Lymphadenopathy:     She has no cervical adenopathy.   Neurological: She is alert and oriented to person, place, and time. She has normal strength. No cranial nerve deficit or sensory deficit.   Skin: Skin is warm and dry. No rash noted. No erythema. No pallor.   Psychiatric: She has a normal mood and affect. Her behavior is normal.   Nursing note and vitals reviewed.      ED Course     ED Course     Procedures        Results for orders placed or performed during the hospital encounter of 02/13/17   Chest XR,  PA & LAT    Narrative    XR CHEST 2 VW 2/13/2017 4:06 PM    COMPARISON: None.    HISTORY: Cough and fever.      Impression    IMPRESSION: Cardiac silhouette and pulmonary vasculature are within  normal limits. No focal airspace disease, pleural effusion or  pneumothorax.    LUPILLO NGUYEN   Rapid strep screen   Result Value Ref Range    Specimen Description Throat     Rapid Strep A Screen       NEGATIVE: No Group A streptococcal antigen detected by immunoassay, await   culture report.      Micro Report Status FINAL 02/13/2017    Influenza A/B antigen swab   Result Value Ref Range    Influenza A/B Agn Specimen Nasopharyngeal     Influenza A Positive (A) NEG    Influenza B  NEG     Negative   Test results must be correlated with  clinical data. If necessary, results   should be confirmed by a molecular assay or viral culture.           Assessments & Plan (with Medical Decision Making)   This patient presented to the emergency department with complaints of fever, chills, myalgias, sore throat, and cough.  Symptoms seemed consistent with influenza and the influenza A testing is positive.  Rapid strep is negative and there is no signs of pneumonia on chest x-ray.  I feel symptoms are all secondary to influenza A.  As the patient has had symptoms for over 4 days, I do not feel that Tamiflu or other anti-influenzal medications are indicated.  Patient will be discharged and instructed on supportive care and was discharged in good condition.    I have reviewed the nursing notes.    I have reviewed the findings, diagnosis, plan and need for follow up with the patient.    New Prescriptions    No medications on file       Final diagnoses:   Influenza A     I, Noel Merchant, am serving as a trained medical scribe to document services personally performed by Son Chi MD, based on the provider's statements to me.      ISon MD, was physically present and have reviewed and verified the accuracy of this note documented by Noel Merchant.     2/13/2017   [facility] EMERGENCY DEPARTMENT     Son Chi MD  02/13/17 [id number]

## 2017-02-15 LAB
BACTERIA SPEC CULT: NORMAL
MICRO REPORT STATUS: NORMAL
SPECIMEN SOURCE: NORMAL

## 2017-02-16 ENCOUNTER — MEDICAL CORRESPONDENCE (OUTPATIENT)
Dept: HEALTH INFORMATION MANAGEMENT | Facility: CLINIC | Age: 22
End: 2017-02-16

## 2017-02-22 ENCOUNTER — TELEPHONE (OUTPATIENT)
Dept: OBGYN | Facility: CLINIC | Age: 22
End: 2017-02-22

## 2017-02-22 NOTE — TELEPHONE ENCOUNTER
----- Message from Aldo Hines sent at 2017  8:41 AM CST -----  Regarding: [facility]- wound care pt  Contact: [phone]  Danika nurse with [facility] would like to notify Dr. Soares that she has been going to pt's home to check pt's  wound per order, but there has been over five times that the pt no showed as well. She will be talking to the pt to discuss the services. Pt is not being compliant with the orders. Any questions, Danika cannon can be reached at [phone].Thanks.      GY    Please DO NOT send this message and/or reply back to sender.  Call Center Representatives DO NOT respond to messages.

## 2017-02-22 NOTE — TELEPHONE ENCOUNTER
Spoke with HC nurse Danika. She states that the last time she saw Claudine was last Friday and her wound had been doing well. It was much smaller and healing. Since then however Claudine has been dodging HC nurses and not being home when she said she would. They are approaching 20 visits and after 20 visits the patient would need to re-apply with medicaid for more visits. HC nurse plans on seeing Claudine again on Friday but wanted MD to be aware that Claudine is not complying.    Nurse left message for Claudine to call back and discuss her situation.

## 2017-02-24 ENCOUNTER — TELEPHONE (OUTPATIENT)
Dept: OBGYN | Facility: CLINIC | Age: 22
End: 2017-02-24

## 2017-02-24 NOTE — TELEPHONE ENCOUNTER
Spoke with Danika  nurse again today. She attempted to visit [facility] today and has not gotten a response. Wondering if she should discharge [facility] from service.    Spoke with Dr. Hale in clinic and she states that she can be discharged, [facility] will call if there are any problems.     Gave Danika this information and she will discharge [facility].

## 2017-03-17 ENCOUNTER — TELEPHONE (OUTPATIENT)
Dept: OBGYN | Facility: CLINIC | Age: 22
End: 2017-03-17

## 2017-03-17 NOTE — TELEPHONE ENCOUNTER
"Received phone call from ELOY nurse with update on Claudine. She has concerns about patients \"mental health\" and is worried that she is not taking care of herself. She states her mother is currently watching her child. The nurse reports that she has contacted CPS but they did not open a case and Claudine is refusing to meet with her .    Claudine still needs to be seen for her postpartum visit at [facility]. Advised [facility] nurse that appropriate routes have been taken and that because she is an adult she will need to follow up with as. We can attempt to get Claudine in for pp visit. She understood plan and had no further questions.    Attempted to call Claudine but she ended phone call. If she calls back she can be scheduled for postpartum visit or annual exam.   "

## 2017-03-21 ENCOUNTER — HOSPITAL ENCOUNTER (EMERGENCY)
Facility: CLINIC | Age: 22
Discharge: HOME | End: 2017-03-22
Attending: FAMILY MEDICINE | Admitting: FAMILY MEDICINE
Payer: COMMERCIAL

## 2017-03-21 ENCOUNTER — APPOINTMENT (OUTPATIENT)
Dept: GENERAL RADIOLOGY | Facility: CLINIC | Age: 22
End: 2017-03-21
Attending: FAMILY MEDICINE
Payer: COMMERCIAL

## 2017-03-21 DIAGNOSIS — S80.212A ABRASION OF LEFT KNEE, INITIAL ENCOUNTER: ICD-10-CM

## 2017-03-21 DIAGNOSIS — S80.212A KNEE ABRASION, LEFT, INITIAL ENCOUNTER: ICD-10-CM

## 2017-03-21 DIAGNOSIS — F10.920 ALCOHOL INTOXICATION, UNCOMPLICATED (H): ICD-10-CM
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
## 2017-03-21 LAB — ALCOHOL BREATH TEST: 0.17 (ref 0–0.01)

## 2017-03-21 PROCEDURE — 99283 EMERGENCY DEPT VISIT LOW MDM: CPT | Performed by: FAMILY MEDICINE

## 2017-03-21 PROCEDURE — 73562 X-RAY EXAM OF KNEE 3: CPT | Mod: LT

## 2017-03-21 PROCEDURE — 82075 ASSAY OF BREATH ETHANOL: CPT | Performed by: FAMILY MEDICINE

## 2017-03-21 PROCEDURE — 99284 EMERGENCY DEPT VISIT MOD MDM: CPT | Mod: Z6 | Performed by: FAMILY MEDICINE

## 2017-03-21 NOTE — ED AVS SNAPSHOT
[facility], Emergency Department    [address]    [facility] [address]    Phone:  [phone]    Fax:  [phone]                                       Claudine Wells   MRN: [id number]    Department:  [facility], [place], Emergency Department   Date of Visit:  3/21/2017           Patient Information     Date Of Birth          1995        Your diagnoses for this visit were:     Alcohol intoxication, uncomplicated (H)     Knee abrasion, left, initial encounter        You were seen by Eben Boo MD and Gumaro Olmos MD.      Follow-up Information     Follow up with Clinic, [facility].    Why:  As needed    Contact information:    [address] [place]  [facility] [id number] [phone]          Discharge Instructions       Discharged to home stop using alcohol and drugs follow up with your primary M.D. as needed.  Keep bacitracin on the abrasion on the knee.    24 Hour Appointment Hotline       To make an appointment at any [place] clinic, call [phone] ([phone]). If you don't have a family doctor or clinic, we will help you find one. [place] clinics are conveniently located to serve the needs of you and your family.             Review of your medicines      Our records show that you are taking the medicines listed below. If these are incorrect, please call your family doctor or clinic.        Dose / Directions Last dose taken    ibuprofen 400 MG tablet   Commonly known as:  ADVIL/MOTRIN   Dose:  400-800 mg   Quantity:  60 tablet        Take 1-2 tablets (400-800 mg) by mouth every 6 hours as needed for other (cramping)   Refills:  0        oxyCODONE 5 MG IR tablet   Commonly known as:  ROXICODONE   Dose:  5 mg   Quantity:  30 tablet        Take 1 tablet (5 mg) by mouth every 3 hours as needed for moderate to severe pain   Refills:  0        senna-docusate 8.6-50 MG per tablet   Commonly known as:  SENOKOT-S;PERICOLACE   Dose:  1-2 tablet   Quantity:  60 tablet     "    Take 1-2 tablets by mouth 2 times daily   Refills:  0                Procedures and tests performed during your visit     Alcohol breath test POCT    Knee XR, 3 views, left      Orders Needing Specimen Collection     None      Pending Results     No orders found for last 3 day(s).            Pending Culture Results     No orders found for last 3 day(s).            Thank you for choosing [facility]       Thank you for choosing [facility] for your care. Our goal is always to provide you with excellent care. Hearing back from our patients is one way we can continue to improve our services. Please take a few minutes to complete the written survey that you may receive in the mail after you visit with us. Thank you!        [facility] [employer] Information     [employer] lets you send messages to your doctor, view your test results, renew your prescriptions, schedule appointments and more. To sign up, go to [email or web address] . Click on \"Log in\" on the left side of the screen, which will take you to the Welcome page. Then click on \"Sign up Now\" on the right side of the page.     You will be asked to enter the access code listed below, as well as some personal information. Please follow the directions to create your username and password.     Your access code is: [email or web address]  Expires: 2017 10:00 AM     Your access code will  in 90 days. If you need help or a new code, please call your [facility] clinic or [phone].        Care EveryWhere ID     This is your Care EveryWhere ID. This could be used by other organizations to access your [facility] medical records  [id number]        After Visit Summary       This is your record. Keep this with you and show to your community pharmacist(s) and doctor(s) at your next visit.                  "

## 2017-03-21 NOTE — ED AVS SNAPSHOT
[facility], [place], Emergency Department    [address]    [facility] [address]    Phone:  [phone]    Fax:  [phone]                                       Claudine Wells   MRN: [id number]    Department:  [facility], Emergency Department   Date of Visit:  3/21/2017           After Visit Summary Signature Page     I have received my discharge instructions, and my questions have been answered. I have discussed any challenges I see with this plan with the nurse or doctor.    ..........................................................................................................................................  Patient/Patient Representative Signature      ..........................................................................................................................................  Patient Representative Print Name and Relationship to Patient    ..................................................               ................................................  Date                                            Time    ..........................................................................................................................................  Reviewed by Signature/Title    ...................................................              ..............................................  Date                                                            Time

## 2017-03-22 VITALS
TEMPERATURE: 98.1 F | RESPIRATION RATE: 16 BRPM | OXYGEN SATURATION: 99 % | DIASTOLIC BLOOD PRESSURE: 80 MMHG | HEART RATE: 89 BPM | BODY MASS INDEX: 35.83 KG/M2 | WEIGHT: 222 LBS | SYSTOLIC BLOOD PRESSURE: 125 MMHG

## 2017-03-22 NOTE — ED PROVIDER NOTES
History     Chief Complaint   Patient presents with     Alcohol Intoxication     Passersby called 911 d/t public intoxication     Knee Injury     Abrasion to left knee from falling down     HPI  Claudine Wells is a 22 year old female who presents to the Emergency Department with alcohol intoxication.     History is limited as patient is unresponsive secondary to alcohol intoxication.     I have reviewed the Medications, Allergies, Past Medical and Surgical History, and Social History in the [facility] system.    PAST MEDICAL HISTORY  Past Medical History:   Diagnosis Date     ADHD (attention deficit hyperactivity disorder)      Anemia      Chlamydia      Gonorrhea      Migraines      PAST SURGICAL HISTORY  Past Surgical History:   Procedure Laterality Date     C/SECTION, CLASSICAL      , Classical      SECTION N/A 2017    Procedure:  SECTION;  Surgeon: Estefany Naylor MD;  Location: [facility] L[facility]D     EXAM UNDER ANESTHESIA ABDOMEN N/A 2017    Procedure: EXAM UNDER ANESTHESIA ABDOMEN;  Surgeon: Taylor Hale MD;  Location: [facility] OR     INCISION AND DRAINAGE ABDOMEN WASHOUT, COMBINED N/A 2017    Procedure: COMBINED INCISION AND DRAINAGE ABDOMEN WASHOUT;  Surgeon: Taylor Hale MD;  Location: [facility] OR     FAMILY HISTORY  Family History   Problem Relation Age of Onset     DIABETES Mother      ?GDM     DIABETES Father      Coronary Artery Disease No family hx of      Hypertension No family hx of      Breast Cancer No family hx of      Colon Cancer No family hx of      Prostate Cancer No family hx of      Other Cancer No family hx of      Depression No family hx of      Anxiety Disorder No family hx of      Substance Abuse No family hx of      Anesthesia Reaction No family hx of      Asthma No family hx of      Genetic Disorder No family hx of      Thyroid Disease No family hx of      Obesity No family hx of      SOCIAL HISTORY  Social History   Substance Use Topics      Smoking status: Current Every Day Smoker     Packs/day: 1.00     Types: Cigarettes     Smokeless tobacco: Never Used     Alcohol use 0.0 oz/week     0 Standard drinks or equivalent per week     MEDICATIONS  No current facility-administered medications for this encounter.      Current Outpatient Prescriptions   Medication     oxyCODONE (ROXICODONE) 5 MG IR tablet     ibuprofen (ADVIL/MOTRIN) 400 MG tablet     senna-docusate (SENOKOT-S;PERICOLACE) 8.6-50 MG per tablet     Facility-Administered Medications Ordered in Other Encounters   Medication     lidocaine-EPINEPHrine 1.5 %-[id number] injection     lidocaine 1 % injection     bupivacaine (MARCAINE) 0.125 % injection (diluted from stock concentration by MD or CRNA)     ALLERGIES  No Known Allergies   Review of Systems   Unable to perform ROS: Mental status change       Physical Exam   BP: 122/84  Pulse: 89  Temp: 97.6  F (36.4  C)  Resp: 16  Weight: 100.7 kg (222 lb)  SpO2: 100 %  Physical Exam   Constitutional: She is oriented to person, place, and time. No distress.   HENT:   Head: Atraumatic.   Mouth/Throat: Oropharynx is clear and moist. No oropharyngeal exudate.   Eyes: Pupils are equal, round, and reactive to light. No scleral icterus.   Cardiovascular: Normal heart sounds and intact distal pulses.    Pulmonary/Chest: Breath sounds normal. No respiratory distress.   Abdominal: Soft. Bowel sounds are normal. There is no tenderness.   Musculoskeletal: She exhibits no edema or tenderness.   Neurological: She is alert and oriented to person, place, and time. She has normal reflexes. No cranial nerve deficit. She exhibits normal muscle tone. Coordination normal.   Skin: Skin is warm. Abrasion noted. She is not diaphoretic.   Patient has abrasion on right knee does not appear to be infected at this time.   Psychiatric: She does not exhibit a depressed mood. She expresses no suicidal ideation.       ED Course     ED Course     Procedures         Critical Care time:   none   XR KNEE LT 3 VW 3/21/2017 11:32 PM      INDICATION: Fall.     COMPARISON: None.         IMPRESSION: Negative.     DENEEN MIRANDA MD            Labs Ordered and Resulted from Time of ED Arrival Up to the Time of Departure from the ED   ALCOHOL BREATH TEST POCT - Abnormal; Notable for the following:        Result Value    Alcohol Breath Test 0.172 (*)     All other components within normal limits       Assessments & Plan (with Medical Decision Making)       I have reviewed the nursing notes.    I have reviewed the findings, diagnosis, plan and need for follow up with the patient.  Patient with abrasion to the left knee as well as significant alcohol intoxication patient denies any desire for detox or treatment she was unable to locate any sober ride home at this time and will remain in the emergency room until she is clinically sober and will then be discharged to home with recommendation to stop using alcohol WITH her primary M.D. keep bacitracin on her wound.    Final diagnoses:   Alcohol intoxication, uncomplicated (H)   Knee abrasion, left, initial encounter     I, Noel Merchant, am serving as a trained medical scribe to document services personally performed by Eben Boo MD, based on the provider's statements to me.      IEben MD, was physically present and have reviewed and verified the accuracy of this note documented by Noel Merchant   3/21/2017   [facility], [place], EMERGENCY DEPARTMENT     Eben Boo MD  03/24/17 7955

## 2017-03-22 NOTE — ED NOTES
Bed: ED09  Expected date: 3/21/17  Expected time: 7:41 PM  Means of arrival: Ambulance  Comments:  Jonel Rodrigues  22y F  ETOH

## 2017-03-22 NOTE — DISCHARGE INSTRUCTIONS
Discharged to home stop using alcohol and drugs follow up with your primary M.D. as needed.  Keep bacitracin on the abrasion on the knee.

## 2017-03-22 NOTE — ED NOTES
Patient was signed out to me by Dr. Nielsen at 1 AM.  Please see their note for full details and history.  Patient has alcohol on the patient.  Apparently also took a sleeping pill.  Complaint of knee pain.  X-rays negative..  Plan at time of sign out is allow patient to sober up in the ER.  Reevaluate in the morning..       Course in the ED:  Reevaluated at 6:27 AM .  Patient is now clinically sober.  No slurred speech, ataxia or signs of alcohol intoxication.  No current signs of withdrawal.  No suicidal ideation will discharge.       Gumaro Olmos MD  03/22/17 0642

## 2020-12-15 ENCOUNTER — HOSPITAL ENCOUNTER (EMERGENCY)
Facility: CLINIC | Age: 25
Discharge: HOME | End: 2020-12-16
Attending: EMERGENCY MEDICINE | Admitting: EMERGENCY MEDICINE
Payer: COMMERCIAL

## 2020-12-15 DIAGNOSIS — F10.920 ALCOHOLIC INTOXICATION WITHOUT COMPLICATION (H): ICD-10-CM

## 2020-12-15 DIAGNOSIS — F43.9 SITUATIONAL STRESS: ICD-10-CM

## 2020-12-15 DIAGNOSIS — R11.0 NAUSEA: ICD-10-CM

## 2020-12-15 DIAGNOSIS — R41.82 ALTERED MENTAL STATUS, UNSPECIFIED ALTERED MENTAL STATUS TYPE: ICD-10-CM

## 2020-12-15 LAB
ANION GAP SERPL CALCULATED.3IONS-SCNC: 6 MMOL/L (ref 3–14)
BASOPHILS # BLD AUTO: 0 10E9/L (ref 0–0.2)
BASOPHILS NFR BLD AUTO: 0.6 %
BUN SERPL-MCNC: 8 MG/DL (ref 7–30)
CALCIUM SERPL-MCNC: 9 MG/DL (ref 8.5–10.1)
CHLORIDE SERPL-SCNC: 111 MMOL/L (ref 94–109)
CO2 SERPL-SCNC: 24 MMOL/L (ref 20–32)
CREAT SERPL-MCNC: 0.72 MG/DL (ref 0.52–1.04)
DIFFERENTIAL METHOD BLD: NORMAL
EOSINOPHIL # BLD AUTO: 0 10E9/L (ref 0–0.7)
EOSINOPHIL NFR BLD AUTO: 0.6 %
ERYTHROCYTE [DISTWIDTH] IN BLOOD BY AUTOMATED COUNT: 12.8 % (ref 10–15)
ETHANOL SERPL-MCNC: 0.24 G/DL
GFR SERPL CREATININE-BSD FRML MDRD: >90 ML/MIN/{1.73_M2}
GLUCOSE SERPL-MCNC: 78 MG/DL (ref 70–99)
HCT VFR BLD AUTO: 43 % (ref 35–47)
HGB BLD-MCNC: 14.4 G/DL (ref 11.7–15.7)
IMM GRANULOCYTES # BLD: 0 10E9/L (ref 0–0.4)
IMM GRANULOCYTES NFR BLD: 0.3 %
LYMPHOCYTES # BLD AUTO: 2.3 10E9/L (ref 0.8–5.3)
LYMPHOCYTES NFR BLD AUTO: 32.5 %
MCH RBC QN AUTO: 29.6 PG (ref 26.5–33)
MCHC RBC AUTO-ENTMCNC: 33.5 G/DL (ref 31.5–36.5)
MCV RBC AUTO: 89 FL (ref 78–100)
MONOCYTES # BLD AUTO: 0.2 10E9/L (ref 0–1.3)
MONOCYTES NFR BLD AUTO: 2.6 %
NEUTROPHILS # BLD AUTO: 4.6 10E9/L (ref 1.6–8.3)
NEUTROPHILS NFR BLD AUTO: 63.4 %
NRBC # BLD AUTO: 0 10*3/UL
NRBC BLD AUTO-RTO: 0 /100
PLATELET # BLD AUTO: 241 10E9/L (ref 150–450)
POTASSIUM SERPL-SCNC: 3.7 MMOL/L (ref 3.4–5.3)
RBC # BLD AUTO: 4.86 10E12/L (ref 3.8–5.2)
SODIUM SERPL-SCNC: 141 MMOL/L (ref 133–144)
WBC # BLD AUTO: 7.2 10E9/L (ref 4–11)

## 2020-12-15 PROCEDURE — 90791 PSYCH DIAGNOSTIC EVALUATION: CPT

## 2020-12-15 PROCEDURE — 96374 THER/PROPH/DIAG INJ IV PUSH: CPT

## 2020-12-15 PROCEDURE — 80048 BASIC METABOLIC PNL TOTAL CA: CPT | Performed by: EMERGENCY MEDICINE

## 2020-12-15 PROCEDURE — 85025 COMPLETE CBC W/AUTO DIFF WBC: CPT | Performed by: EMERGENCY MEDICINE

## 2020-12-15 PROCEDURE — 250N000011 HC RX IP 250 OP 636

## 2020-12-15 PROCEDURE — 99285 EMERGENCY DEPT VISIT HI MDM: CPT | Mod: 25

## 2020-12-15 PROCEDURE — 80320 DRUG SCREEN QUANTALCOHOLS: CPT | Performed by: EMERGENCY MEDICINE

## 2020-12-15 RX ORDER — WATER 10 ML/10ML
INJECTION INTRAMUSCULAR; INTRAVENOUS; SUBCUTANEOUS
Status: DISPENSED
Start: 2020-12-15 | End: 2020-12-16

## 2020-12-15 RX ORDER — OLANZAPINE 10 MG/2ML
INJECTION, POWDER, FOR SOLUTION INTRAMUSCULAR
Status: COMPLETED
Start: 2020-12-15 | End: 2020-12-15

## 2020-12-15 RX ORDER — OLANZAPINE 10 MG/2ML
10 INJECTION, POWDER, FOR SOLUTION INTRAMUSCULAR 2 TIMES DAILY PRN
Status: DISCONTINUED | OUTPATIENT
Start: 2020-12-15 | End: 2020-12-16 | Stop reason: HOSPADM

## 2020-12-15 RX ORDER — ACETAMINOPHEN 325 MG/1
650 TABLET ORAL EVERY 4 HOURS PRN
Status: DISCONTINUED | OUTPATIENT
Start: 2020-12-15 | End: 2020-12-16 | Stop reason: HOSPADM

## 2020-12-15 RX ORDER — IBUPROFEN 600 MG/1
600 TABLET, FILM COATED ORAL EVERY 4 HOURS PRN
Status: DISCONTINUED | OUTPATIENT
Start: 2020-12-15 | End: 2020-12-16 | Stop reason: HOSPADM

## 2020-12-15 RX ORDER — OLANZAPINE 10 MG/2ML
10 INJECTION, POWDER, FOR SOLUTION INTRAMUSCULAR ONCE
Status: DISCONTINUED | OUTPATIENT
Start: 2020-12-15 | End: 2020-12-16 | Stop reason: HOSPADM

## 2020-12-15 RX ORDER — HYDROXYZINE HYDROCHLORIDE 25 MG/1
25 TABLET, FILM COATED ORAL EVERY 4 HOURS PRN
Status: DISCONTINUED | OUTPATIENT
Start: 2020-12-15 | End: 2020-12-16 | Stop reason: HOSPADM

## 2020-12-15 RX ADMIN — OLANZAPINE 10 MG: 10 INJECTION, POWDER, FOR SOLUTION INTRAMUSCULAR at 20:37

## 2020-12-15 NOTE — ED AVS SNAPSHOT
[facility] Emergency Dept  [address] [address]  Phone: [phone]  Fax: [phone]                                    Claudine Wells   MRN: [id number]    Department: [facility] Emergency Dept   Date of Visit: 12/15/2020           After Visit Summary Signature Page    I have received my discharge instructions, and my questions have been answered. I have discussed any challenges I see with this plan with the nurse or doctor.    ..........................................................................................................................................  Patient/Patient Representative Signature      ..........................................................................................................................................  Patient Representative Print Name and Relationship to Patient    ..................................................               ................................................  Date                                   Time    ..........................................................................................................................................  Reviewed by Signature/Title    ...................................................              ..............................................  Date                                               Time          22EPIC Rev 08/18

## 2020-12-16 VITALS
RESPIRATION RATE: 16 BRPM | OXYGEN SATURATION: 100 % | SYSTOLIC BLOOD PRESSURE: 111 MMHG | DIASTOLIC BLOOD PRESSURE: 61 MMHG | HEART RATE: 69 BPM

## 2020-12-16 LAB — HCG SERPL QL: NEGATIVE

## 2020-12-16 PROCEDURE — 250N000011 HC RX IP 250 OP 636: Performed by: EMERGENCY MEDICINE

## 2020-12-16 PROCEDURE — 90791 PSYCH DIAGNOSTIC EVALUATION: CPT

## 2020-12-16 PROCEDURE — 250N000013 HC RX MED GY IP 250 OP 250 PS 637: Performed by: EMERGENCY MEDICINE

## 2020-12-16 PROCEDURE — 84703 CHORIONIC GONADOTROPIN ASSAY: CPT | Performed by: EMERGENCY MEDICINE

## 2020-12-16 PROCEDURE — 250N000011 HC RX IP 250 OP 636

## 2020-12-16 RX ORDER — ONDANSETRON 4 MG/1
4 TABLET, ORALLY DISINTEGRATING ORAL EVERY 6 HOURS PRN
Qty: 10 TABLET | Refills: 0 | Status: SHIPPED | OUTPATIENT
Start: 2020-12-16 | End: 2020-12-23

## 2020-12-16 RX ORDER — ONDANSETRON 2 MG/ML
INJECTION INTRAMUSCULAR; INTRAVENOUS
Status: COMPLETED
Start: 2020-12-16 | End: 2020-12-16

## 2020-12-16 RX ORDER — ONDANSETRON 2 MG/ML
4 INJECTION INTRAMUSCULAR; INTRAVENOUS ONCE
Status: COMPLETED | OUTPATIENT
Start: 2020-12-16 | End: 2020-12-16

## 2020-12-16 RX ORDER — OLANZAPINE 5 MG/1
5 TABLET, ORALLY DISINTEGRATING ORAL ONCE
Status: COMPLETED | OUTPATIENT
Start: 2020-12-16 | End: 2020-12-16

## 2020-12-16 RX ADMIN — ONDANSETRON: 2 INJECTION INTRAMUSCULAR; INTRAVENOUS at 01:56

## 2020-12-16 RX ADMIN — ONDANSETRON 4 MG: 2 INJECTION INTRAMUSCULAR; INTRAVENOUS at 07:09

## 2020-12-16 RX ADMIN — OLANZAPINE 5 MG: 5 TABLET, ORALLY DISINTEGRATING ORAL at 08:19

## 2020-12-16 NOTE — ED NOTES
Pt still in her clothes & winter jacket upon arrival and placed in restraints.  Pt also has a pair of high boots and a backpack that were placed in the secured locker.

## 2020-12-16 NOTE — ED TRIAGE NOTES
Pt was at [facility] being held by PD upon EMS arrival. Pt combative and agitated arrived in restraints and received 5mg IM Versed PTA.Dr Michaud upon arrival

## 2020-12-16 NOTE — ED NOTES
Pt states still nauseous, gave patient po zyprexa and water. Pt has taken off all of her clothes and is walking around wrapped in a blanket. Requested patient to get dressed. MD notified of continued nausea. Sent bloodwork for HCG.

## 2020-12-16 NOTE — ED NOTES
States she is nauseas and states she is too sick to discharge.  I called MD 1 and Dr. Patel stated Dr. Lal will round on patient.  I ordered and gave zofran.

## 2020-12-16 NOTE — ED NOTES
Ordered patient breakfast tray and told pt that she can eat and then we will get her discharged. Pt in agreement.

## 2020-12-16 NOTE — ED PROVIDER NOTES
History   Chief Complaint:  Psychiatric Evaluation     HPI The history is obtained through EMS and is limited due to the patient's altered mental status.     Claudine Wells is a 25 year old female who presents via EMS for psychiatric evaluation. Today EMS was called by the police to evaluate the patient for erratic and agitated behavior at a [employer]. The patient was reportedly combative with EMS and was placed in restraints and provided 5 mg of IM Versed before being brought into the ED for evaluation. Here in the ED, the patient remains agitated does not provide additional history.     Review of Systems   Unable to perform ROS: Mental status change     Allergies:   The patient has no known allergies.      Medications:  The patient is not currently taking any prescribed medications.      Past Medical History:    ADHD   Anemia  Migraines   HPV       Past Surgical History:     section  Exam under anesthesia abdomen  Incision and drainage abdomen washout      Family History:    Diabetes - Mother and father     Social History:   Accompanied to ED by:  EMS      Physical Exam     Patient Vitals for the past 24 hrs:   BP Pulse Resp SpO2   12/15/20 2120 -- 67 14 100 %   12/15/20 2110 100/56 111 19 100 %   12/15/20 2105 112/59 84 -- 100 %       Physical Exam  General/Appearance: appears older than stated age, disheveled, 4-point restraints and thrashing about on cot, attempted to bite staff  Eyes: EOMI, no scleral injection, no icterus  ENT: MMM, poor dentition  Neck: supple, nl ROM, no stiffness  Cardiovascular: tachy, nl S1S2, no m/r/g, 2+ pulses in all 4 extremities, cap refill <2sec  Respiratory: CTAB, good air movement throughout, no wheezes/rhonchi/rales, no increased WOB, no retractions  GI: abd soft, non-distended, nttp,  no HSM, no rebound, no guarding, nl BS  MSK: BYERS, good tone, no bony abnormality  Skin: warm and well-perfused, no rash, no edema, no ecchymosis, nl turgor  Neuro: alert, no focal gross  neuro deficits but erratic and violent behavior prohibits better detailed exam  Psych: thrashing on cot, labile behavior and frequently begging for help them swearing at staff and trying to grab them/bite them, disheveled appearance, uncooperative so more-detailed exam difficult  Heme: no petechia, no purpura, no active bleeding      Emergency Department Course      Laboratory:  CBC: WNL (WBC 7.2, HGB 14.4, )   BMP: Chloride 111 high, o/w WNL (Creatinine 0.72)   Alcohol Level Blood: 0.024 high   Drug Abuse Screen Urine: Pending      Emergency Department Course:   Reviewed:  I reviewed the patient's nursing notes, vitals, past medical records, Care Everywhere.     Assessments:  2020: Patient was brought to the ED via EMS. I performed an exam of the patient as documented above.     In person face to face assessment completed, including an evaluation of the patient's immediate reaction to the intervention, complete review of systems assessment, behavioral assessment and review/assessment of history, drugs and medications, recent labs, etc., and behavioral condition.  The patient experienced: No adverse physical outcome from seclusion/restraint initiation.  The intervention of restraint or seclusion needs to continue.    8:20 PM     Interventions:  2037 Zyprexa 10 mg IM      Disposition:  Care of the patient was transferred to my colleague Dr. Umana.     Impression & Plan      Medical Decision Making:  This patient is a 25-year-old female who presents today with erratic and violent behavior.  At this point in time it is still unclear what is causing this.  Chart review shows that she does have a remote history of substance abuse including THC and cocaine so is possible this is a contributing factor.  She also has 1 ER visit for alcohol intoxication, so it is possible this is contributing.  I do not see any significant mental health disorder history except her ADHD.  Unfortunately prior to arrival and here she was  combative and attempting to swing at staff, kick staff, bite staff. This resulted in the use of both chemical as well as physical restraints.  At the time of this note she is still requiring restraints.  We have not been able to obtain labs due to her violent behavior however vitally she is stable, I am not seeing any focal neurologic deficit.  She is being handed off to the oncoming provider for ultimate disposition.  At this point in time I do not see any reason to get DEC involved.  I think this is something where he just requires time for likely would have her substances on board to metabolize.  If she is not clearing as would be expected that would be the point at which I would get DEC involved.      Diagnosis:    ICD-10-CM   1. Altered mental status, unspecified altered mental status type  R41.82         Scribe Disclosure:  I, Timo Suresh, am serving as a scribe at 8:20 PM on 12/15/2020 to document services personally performed by Dr. Rosario, based on my observations and the provider's statements to me.            Kasia Rosario MD  12/15/20 8902

## 2020-12-16 NOTE — ED NOTES
Patient was given criteria for cessation of restraints and she agreed to comply, restraints were removed, patient ambulated to the bathroom and returned to her room without assistance and without any difficulties. Patient now appears to be resting comfortably after drinking ice water.

## 2020-12-16 NOTE — ED NOTES
Pt up and down from room to bathroom, stating she is nauseous but still requesting water and juice.

## 2020-12-16 NOTE — ED NOTES
Pts ride is coming from [facility], pt will discharge at 1130. All instructions and prescription given to pt already.

## 2020-12-16 NOTE — ED PROVIDER NOTES
Patient was signed out to me awaiting clinical sobriety. At about 2 AM, the patient was up and walking. She was confused about how she arrived in the ED, and was angry and frustrated about being held here. She requested discharge to home.    When I spoke to the patient, we discussed her lab test that she has had thus far. She reported she has been drinking heavily because she is stressed because the father of her child is in care home. She said she was not feeling suicidal, but has been depressed, and that is why she has been drinking heavily. She wanted to talk to a mental health provider.    Dec assessment is requested.    Patient appears clinically sober. Her mood is labile, but her gait is steady and her speech is normal.     Pradip Umana MD  12/16/20 2500    At 6:30 AM, the deck  called me.  She has spoken to the patient, and feels that the patient is safe for discharge to home.  Patient reports situational stress, but she is not feeling suicidal.  The mental health  set the patient up for a virtual therapy session which will happen tomorrow.    At 715, the patient complained of feeling nauseated.  I suspect she is having some hangover from her alcohol use.  She got a dose of Zofran.  If this does not resolve her symptoms oral Zyprexa is ordered.  The patient is not having any abdominal pain.  She had laboratory testing last night which was unremarkable.  There is no abdominal tenderness.  I do not think there is a role for repeat lab testing or imaging.    The plan is still for discharge.  She is prescribed Zofran for home.     Pradip Umana MD  12/16/20 7912

## 2020-12-16 NOTE — ED NOTES
Bed: [facility]  Expected date: 12/15/20  Expected time: 8:20 PM  Means of arrival: Ambulance  Comments:  [address] 25F MH, combative; restrained
